# Patient Record
Sex: FEMALE | Race: OTHER | HISPANIC OR LATINO | URBAN - METROPOLITAN AREA
[De-identification: names, ages, dates, MRNs, and addresses within clinical notes are randomized per-mention and may not be internally consistent; named-entity substitution may affect disease eponyms.]

---

## 2018-07-31 ENCOUNTER — INPATIENT (INPATIENT)
Facility: HOSPITAL | Age: 83
LOS: 2 days | Discharge: HOME | DRG: 378 | End: 2018-08-03
Attending: INTERNAL MEDICINE | Admitting: INTERNAL MEDICINE
Payer: MEDICARE

## 2018-07-31 VITALS
DIASTOLIC BLOOD PRESSURE: 72 MMHG | RESPIRATION RATE: 18 BRPM | OXYGEN SATURATION: 99 % | HEART RATE: 96 BPM | SYSTOLIC BLOOD PRESSURE: 131 MMHG | WEIGHT: 100.09 LBS | TEMPERATURE: 98 F

## 2018-07-31 DIAGNOSIS — D50.0 IRON DEFICIENCY ANEMIA SECONDARY TO BLOOD LOSS (CHRONIC): ICD-10-CM

## 2018-07-31 DIAGNOSIS — F03.90 UNSPECIFIED DEMENTIA WITHOUT BEHAVIORAL DISTURBANCE: ICD-10-CM

## 2018-07-31 DIAGNOSIS — Z29.9 ENCOUNTER FOR PROPHYLACTIC MEASURES, UNSPECIFIED: ICD-10-CM

## 2018-07-31 DIAGNOSIS — K92.2 GASTROINTESTINAL HEMORRHAGE, UNSPECIFIED: ICD-10-CM

## 2018-07-31 DIAGNOSIS — R60.9 EDEMA, UNSPECIFIED: ICD-10-CM

## 2018-07-31 DIAGNOSIS — Z71.89 OTHER SPECIFIED COUNSELING: ICD-10-CM

## 2018-07-31 LAB
ALBUMIN SERPL ELPH-MCNC: 4.4 G/DL — SIGNIFICANT CHANGE UP (ref 3.3–5)
ALP SERPL-CCNC: 124 U/L — HIGH (ref 40–120)
ALT FLD-CCNC: 15 U/L — SIGNIFICANT CHANGE UP (ref 10–45)
ANION GAP SERPL CALC-SCNC: 14 MMOL/L — SIGNIFICANT CHANGE UP (ref 5–17)
APTT BLD: 23.3 SEC — LOW (ref 27.5–37.4)
AST SERPL-CCNC: 29 U/L — SIGNIFICANT CHANGE UP (ref 10–40)
BASOPHILS # BLD AUTO: 0.1 K/UL — SIGNIFICANT CHANGE UP (ref 0–0.2)
BASOPHILS NFR BLD AUTO: 1 % — SIGNIFICANT CHANGE UP (ref 0–2)
BILIRUB SERPL-MCNC: 0.4 MG/DL — SIGNIFICANT CHANGE UP (ref 0.2–1.2)
BLD GP AB SCN SERPL QL: NEGATIVE — SIGNIFICANT CHANGE UP
BUN SERPL-MCNC: 26 MG/DL — HIGH (ref 7–23)
CALCIUM SERPL-MCNC: 9.2 MG/DL — SIGNIFICANT CHANGE UP (ref 8.4–10.5)
CHLORIDE SERPL-SCNC: 102 MMOL/L — SIGNIFICANT CHANGE UP (ref 96–108)
CO2 SERPL-SCNC: 23 MMOL/L — SIGNIFICANT CHANGE UP (ref 22–31)
CREAT SERPL-MCNC: 0.93 MG/DL — SIGNIFICANT CHANGE UP (ref 0.5–1.3)
EOSINOPHIL # BLD AUTO: 0.2 K/UL — SIGNIFICANT CHANGE UP (ref 0–0.5)
EOSINOPHIL NFR BLD AUTO: 3.3 % — SIGNIFICANT CHANGE UP (ref 0–6)
GAS PNL BLDV: SIGNIFICANT CHANGE UP
GLUCOSE SERPL-MCNC: 104 MG/DL — HIGH (ref 70–99)
HCT VFR BLD CALC: 25.1 % — LOW (ref 34.5–45)
HCT VFR BLD CALC: 27.2 % — LOW (ref 34.5–45)
HGB BLD-MCNC: 7.3 G/DL — LOW (ref 11.5–15.5)
HGB BLD-MCNC: 7.9 G/DL — LOW (ref 11.5–15.5)
INR BLD: 1.13 RATIO — SIGNIFICANT CHANGE UP (ref 0.88–1.16)
LYMPHOCYTES # BLD AUTO: 1.2 K/UL — SIGNIFICANT CHANGE UP (ref 1–3.3)
LYMPHOCYTES # BLD AUTO: 18.2 % — SIGNIFICANT CHANGE UP (ref 13–44)
MCHC RBC-ENTMCNC: 18.1 PG — LOW (ref 27–34)
MCHC RBC-ENTMCNC: 18.1 PG — LOW (ref 27–34)
MCHC RBC-ENTMCNC: 29 GM/DL — LOW (ref 32–36)
MCHC RBC-ENTMCNC: 29.2 GM/DL — LOW (ref 32–36)
MCV RBC AUTO: 62.1 FL — LOW (ref 80–100)
MCV RBC AUTO: 62.4 FL — LOW (ref 80–100)
MONOCYTES # BLD AUTO: 0.7 K/UL — SIGNIFICANT CHANGE UP (ref 0–0.9)
MONOCYTES NFR BLD AUTO: 10.8 % — SIGNIFICANT CHANGE UP (ref 2–14)
NEUTROPHILS # BLD AUTO: 4.4 K/UL — SIGNIFICANT CHANGE UP (ref 1.8–7.4)
NEUTROPHILS NFR BLD AUTO: 66.7 % — SIGNIFICANT CHANGE UP (ref 43–77)
PLATELET # BLD AUTO: 235 K/UL — SIGNIFICANT CHANGE UP (ref 150–400)
PLATELET # BLD AUTO: 271 K/UL — SIGNIFICANT CHANGE UP (ref 150–400)
POTASSIUM SERPL-MCNC: 3.5 MMOL/L — SIGNIFICANT CHANGE UP (ref 3.5–5.3)
POTASSIUM SERPL-SCNC: 3.5 MMOL/L — SIGNIFICANT CHANGE UP (ref 3.5–5.3)
PROT SERPL-MCNC: 8.2 G/DL — SIGNIFICANT CHANGE UP (ref 6–8.3)
PROTHROM AB SERPL-ACNC: 12.2 SEC — SIGNIFICANT CHANGE UP (ref 9.8–12.7)
RBC # BLD: 4.04 M/UL — SIGNIFICANT CHANGE UP (ref 3.8–5.2)
RBC # BLD: 4.36 M/UL — SIGNIFICANT CHANGE UP (ref 3.8–5.2)
RBC # FLD: 18.4 % — HIGH (ref 10.3–14.5)
RBC # FLD: 18.5 % — HIGH (ref 10.3–14.5)
RH IG SCN BLD-IMP: POSITIVE — SIGNIFICANT CHANGE UP
RH IG SCN BLD-IMP: POSITIVE — SIGNIFICANT CHANGE UP
SODIUM SERPL-SCNC: 139 MMOL/L — SIGNIFICANT CHANGE UP (ref 135–145)
WBC # BLD: 6.5 K/UL — SIGNIFICANT CHANGE UP (ref 3.8–10.5)
WBC # BLD: 6.6 K/UL — SIGNIFICANT CHANGE UP (ref 3.8–10.5)
WBC # FLD AUTO: 6.5 K/UL — SIGNIFICANT CHANGE UP (ref 3.8–10.5)
WBC # FLD AUTO: 6.6 K/UL — SIGNIFICANT CHANGE UP (ref 3.8–10.5)

## 2018-07-31 PROCEDURE — 99497 ADVNCD CARE PLAN 30 MIN: CPT | Mod: 25

## 2018-07-31 PROCEDURE — 93010 ELECTROCARDIOGRAM REPORT: CPT

## 2018-07-31 PROCEDURE — 99223 1ST HOSP IP/OBS HIGH 75: CPT

## 2018-07-31 PROCEDURE — 71045 X-RAY EXAM CHEST 1 VIEW: CPT | Mod: 26

## 2018-07-31 PROCEDURE — 99285 EMERGENCY DEPT VISIT HI MDM: CPT | Mod: 25

## 2018-07-31 RX ORDER — ACETAMINOPHEN 500 MG
650 TABLET ORAL ONCE
Qty: 0 | Refills: 0 | Status: COMPLETED | OUTPATIENT
Start: 2018-07-31 | End: 2018-07-31

## 2018-07-31 RX ORDER — PANTOPRAZOLE SODIUM 20 MG/1
40 TABLET, DELAYED RELEASE ORAL
Qty: 0 | Refills: 0 | Status: DISCONTINUED | OUTPATIENT
Start: 2018-07-31 | End: 2018-08-03

## 2018-07-31 RX ORDER — FUROSEMIDE 40 MG
1 TABLET ORAL
Qty: 0 | Refills: 0 | COMMUNITY

## 2018-07-31 RX ORDER — QUETIAPINE FUMARATE 200 MG/1
12.5 TABLET, FILM COATED ORAL ONCE
Qty: 0 | Refills: 0 | Status: COMPLETED | OUTPATIENT
Start: 2018-07-31 | End: 2018-07-31

## 2018-07-31 RX ORDER — DONEPEZIL HYDROCHLORIDE 10 MG/1
1 TABLET, FILM COATED ORAL
Qty: 0 | Refills: 0 | COMMUNITY

## 2018-07-31 RX ORDER — DONEPEZIL HYDROCHLORIDE 10 MG/1
10 TABLET, FILM COATED ORAL AT BEDTIME
Qty: 0 | Refills: 0 | Status: DISCONTINUED | OUTPATIENT
Start: 2018-07-31 | End: 2018-08-03

## 2018-07-31 RX ORDER — QUETIAPINE FUMARATE 200 MG/1
1 TABLET, FILM COATED ORAL
Qty: 0 | Refills: 0 | COMMUNITY

## 2018-07-31 RX ORDER — QUETIAPINE FUMARATE 200 MG/1
25 TABLET, FILM COATED ORAL AT BEDTIME
Qty: 0 | Refills: 0 | Status: DISCONTINUED | OUTPATIENT
Start: 2018-07-31 | End: 2018-08-03

## 2018-07-31 RX ADMIN — QUETIAPINE FUMARATE 25 MILLIGRAM(S): 200 TABLET, FILM COATED ORAL at 21:40

## 2018-07-31 RX ADMIN — DONEPEZIL HYDROCHLORIDE 10 MILLIGRAM(S): 10 TABLET, FILM COATED ORAL at 21:40

## 2018-07-31 RX ADMIN — QUETIAPINE FUMARATE 12.5 MILLIGRAM(S): 200 TABLET, FILM COATED ORAL at 16:31

## 2018-07-31 NOTE — H&P ADULT - PROBLEM SELECTOR PLAN 2
Currently Hb stable 7.9, associated with generalized weakness. no episodes of melena and BRBPR    f/u CBC in am, monitor H/h, transfuse <8    monitor vitals   protonix  GI consult Currently Hb stable 7.9, associated with generalized weakness. no episodes of melena and BRBPR    f/u CBC in am, monitor H/h, transfuse <8    monitor vitals   protonix  GI consult for possible EGD / colonoscopy

## 2018-07-31 NOTE — ED PROVIDER NOTE - ATTENDING CONTRIBUTION TO CARE
Private Physician Fei  86y female pmh  Dementia, comes to ed referred from pmd office, Seen for first time yesterday with murmur labs today hgb 6/25. Remote hx of gi bleed few months ago. Discussed with Private Physician referred for admit /tranfsion. PE WDWN nad normocephalic atraumatic chest clear anterior & posterior abd soft +bs no mass guarding, Neruo no focal defecs. CV 2/6   Marco Becker MD, Facep

## 2018-07-31 NOTE — H&P ADULT - ASSESSMENT
87 y.o female with PMhx of Alzheimer's dementia was sent to the hospital by PCP due to significant drop in H/H 6.9. and weakness for the last few months. In the ED was noted to have + guaiac, Hb of 7.9, hemodynamic stable has not required any PRBC, is being admitted for symptomatic anemia 2/2 GI bleed.

## 2018-07-31 NOTE — CHART NOTE - NSCHARTNOTEFT_GEN_A_CORE
called by RN with H/H of 7.3/25.1. Pt with dementia, [place] speaking.   Pt admitted with anemia, + guaiac in ED.   Pt asymptomatic, no active bleeding  Vital Signs Last 24 Hrs  T(C): 36.9 (31 Jul 2018 20:43), Max: 36.9 (31 Jul 2018 20:43)  T(F): 98.5 (31 Jul 2018 20:43), Max: 98.5 (31 Jul 2018 20:43)  HR: 102 (31 Jul 2018 20:43) (80 - 102)  BP: 125/65 (31 Jul 2018 20:43) (125/65 - 145/72)  BP(mean): 91 (31 Jul 2018 16:01) (91 - 91)  RR: 18 (31 Jul 2018 20:43) (18 - 18)  SpO2: 98% (31 Jul 2018 20:43) (96% - 99%)    Unable to get son on phone for consent for blood transfusion, message left. D/W Dr. Tee. Will hold off on transfusion tonight as pt hemodynamically stable.. Repeat CBC in AM. F/U with primary team in AM.     Lianna Aldana NP  [id number]

## 2018-07-31 NOTE — H&P ADULT - PROBLEM SELECTOR PLAN 4
no pedal edema noted, per son Patient is asymptomatic   leg elevation   hold off on lasix 30 mg daily for now.

## 2018-07-31 NOTE — H&P ADULT - PROBLEM SELECTOR PLAN 5
Spoke face to face with son ( Alexander Rivas) at bedside, in the presence of the patient for 20 mins about advance care planning.   He is the HCP, will bring the form for record tomorrow  He would like the patient to be DNR/DNI, he will discuss with family and likely sign the MOLST form tomorrow.

## 2018-07-31 NOTE — H&P ADULT - PROBLEM SELECTOR PLAN 3
Currently Hb stable 7.9, associated with generalized weakness. no episodes of melena and BRBPR, + guaiac in ED    f/u CBC in am, monitor H/h, transfuse <8    monitor vitals   protonix  GI consult Currently Hb stable 7.9, associated with generalized weakness. no episodes of melena and BRBPR, + guaiac in ED    f/u CBC in am, monitor H/h, transfuse <8    monitor vitals   protonix  GI consult for possible EGD / colonoscopy

## 2018-07-31 NOTE — ED PROVIDER NOTE - OBJECTIVE STATEMENT
85yo female PMH dementia 85yo female PMH dementia, HTN, presenting from doctor's office after having routine lab work and found to have Hb of 6.8. Patient states that she has been feeling a little weak over the last few months. No chest pain or shortness of breath. Patient has h/o GI bleed a few months ago as per PMD. Patient denies black or bloody stools. No bleeding from anywhere else. No abdominal pain. No dizziness or lightheadedness.

## 2018-07-31 NOTE — H&P ADULT - HISTORY OF PRESENT ILLNESS
87 y.o female with PMhx of Alzheimer's dementia was sent to the hospital by PCP due to significant drop in H/H 6.8. History was taken from son Alexander Rivas at bedside due to pt's underling dementia. 87 y.o female with PMhx of Alzheimer's dementia was sent to the hospital by PCP due to significant drop in H/H 6.8. History was taken from son Alexander Rivas at bedside due to pt's underling dementia. Patient has been feeling weak for the last few months. She had an episode of bleeding ( unsure per rectum or vagina) 4 m.o ago when Per HHA she noticed BRB in the patient's underwear. Subsequently she was seen by her PCP this week who checked her H/H and noticed the significant drop. She denies any chest pain, SOB, abdominal pain, weight loss, n/v and bruising.     As per son she has never has a colonoscopy, EGD and Pap smear in the past. The patient lives with her daughter at home, has a HHA for 10 hr x 4 days. She is able to ambulate without any assistance, is mainly dependent on her ADLs and at times gets agitated in the evenings. She was recently started on furosemide 20 mg daily by her PCP due to b/l LE edema. 87 y.o female with PMhx of Alzheimer's dementia was sent to the hospital by PCP due to significant drop in H/H 6.8. History was taken from son Alexander Rivas at bedside due to pt's underling dementia. Patient has been feeling weak for the last few months. She had an episode of bleeding ( unsure per rectum or vagina) 4 m.o ago when Per HHA she noticed BRB in the patient's underwear. Subsequently she was seen by her PCP this week who checked her H/H and noticed the significant drop. She denies any chest pain, SOB, abdominal pain, weight loss, n/v, melena, BRBPR and bruising. In the ED pt had a + stool guaiac.    As per son she has never has a colonoscopy, EGD and Pap smear in the past. The patient lives with her daughter at home, has a HHA for 10 hr x 4 days. She is able to ambulate without any assistance, is mainly dependent on her ADLs and at times gets agitated in the evenings. She was recently started on furosemide 20 mg daily by her PCP due to b/l LE edema. 87 y.o female with PMhx of Alzheimer's dementia was sent to the hospital by PCP due to significant drop in H/H 6.8. History was taken from son Alexander Rivas at bedside due to pt's underling dementia. Patient has been feeling weak for the last few months. She had an episode of bleeding ( unsure per rectum or vagina) 4 m.o ago when Per HHA she noticed BRB in the patient's underwear. Subsequently she was seen by her PCP this week who checked her H/H and noticed the significant drop. She denies any chest pain, SOB, abdominal pain, weight loss, n/v, melena, BRBPR and bruising. In the ED pt had a + stool guaiac.    As per son she has never has a colonoscopy, EGD and Pap smear in the past. The patient lives with her daughter at home, has a HHA for 10 hr x 4 days. She is able to ambulate without any assistance, is mainly dependent on her ADLs and at times gets agitated in the evenings. She was recently started on furosemide 20 mg daily by her PCP due to b/l LE edema, pt a asymptomatic.

## 2018-07-31 NOTE — ED ADULT NURSE NOTE - NSIMPLEMENTINTERV_GEN_ALL_ED
Implemented All Fall with Harm Risk Interventions:  [place] to call system. Call bell, personal items and telephone within reach. Instruct patient to call for assistance. Room bathroom lighting operational. Non-slip footwear when patient is off stretcher. Physically safe environment: no spills, clutter or unnecessary equipment. Stretcher in lowest position, wheels locked, appropriate side rails in place. Provide visual cue, wrist band, yellow gown, etc. Monitor gait and stability. Monitor for mental status changes and reorient to person, place, and time. Review medications for side effects contributing to fall risk. Reinforce activity limits and safety measures with patient and family. Provide visual clues: red socks.

## 2018-07-31 NOTE — ED ADULT NURSE REASSESSMENT NOTE - NS ED NURSE REASSESS COMMENT FT1
Received report from covering nurse Humberto Bailey.  No acute respiratory distress noted, v/s obtained.

## 2018-07-31 NOTE — ED PROVIDER NOTE - MEDICAL DECISION MAKING DETAILS
87yo female with anemia and stool + blood, likely GI bleed, will check labs, admit to hospital. Laura Gusman DO

## 2018-07-31 NOTE — ED PROVIDER NOTE - PHYSICAL EXAMINATION
Gen: NAD  Head: NCAT  Lung: CTAB, no respiratory distress, no wheezing, rales, rhonchi  CV: normal s1/s2, rrr, no murmurs, Normal perfusion, pulses 2+ throughout  Abd: soft, NTND  MSK: No edema, no visible deformities, full range of motion in all 4 extremities  Neuro: No focal neurologic deficits  Skin: No rash   Psych: normal affect   Rectal: Guaiac +, brown stool

## 2018-07-31 NOTE — H&P ADULT - NSHPLABSRESULTS_GEN_ALL_CORE
Labs personally reviewed:                        7.9    6.6   )-----------( 271      ( 31 Jul 2018 13:58 )             27.2       07-31    139  |  102  |  26<H>  ----------------------------<  104<H>  3.5   |  23  |  0.93    Ca    9.2      31 Jul 2018 13:58    TPro  8.2  /  Alb  4.4  /  TBili  0.4  /  DBili  x   /  AST  29  /  ALT  15  /  AlkPhos  124<H>  07-31      PT/INR - ( 31 Jul 2018 13:58 )   PT: 12.2 sec;   INR: 1.13 ratio         PTT - ( 31 Jul 2018 13:58 )  PTT:23.3 sec      Imaging personally reviewed: CXR: no acute pulmonary process     EKG personally reviewed: Labs personally reviewed:                        7.9    6.6   )-----------( 271      ( 31 Jul 2018 13:58 )             27.2       07-31    139  |  102  |  26<H>  ----------------------------<  104<H>  3.5   |  23  |  0.93    Ca    9.2      31 Jul 2018 13:58    TPro  8.2  /  Alb  4.4  /  TBili  0.4  /  DBili  x   /  AST  29  /  ALT  15  /  AlkPhos  124<H>  07-31      PT/INR - ( 31 Jul 2018 13:58 )   PT: 12.2 sec;   INR: 1.13 ratio         PTT - ( 31 Jul 2018 13:58 )  PTT:23.3 sec      Imaging personally reviewed: CXR: no acute pulmonary process

## 2018-08-01 LAB
HCT VFR BLD CALC: 25.7 % — LOW (ref 34.5–45)
HCT VFR BLD CALC: 31.5 % — LOW (ref 34.5–45)
HGB BLD-MCNC: 7.3 G/DL — LOW (ref 11.5–15.5)
HGB BLD-MCNC: 9.6 G/DL — LOW (ref 11.5–15.5)
MCHC RBC-ENTMCNC: 18.2 PG — LOW (ref 27–34)
MCHC RBC-ENTMCNC: 20 PG — LOW (ref 27–34)
MCHC RBC-ENTMCNC: 28.4 GM/DL — LOW (ref 32–36)
MCHC RBC-ENTMCNC: 30.6 GM/DL — LOW (ref 32–36)
MCV RBC AUTO: 63.9 FL — LOW (ref 80–100)
MCV RBC AUTO: 65.5 FL — LOW (ref 80–100)
PLATELET # BLD AUTO: 244 K/UL — SIGNIFICANT CHANGE UP (ref 150–400)
PLATELET # BLD AUTO: 265 K/UL — SIGNIFICANT CHANGE UP (ref 150–400)
RBC # BLD: 4.02 M/UL — SIGNIFICANT CHANGE UP (ref 3.8–5.2)
RBC # BLD: 4.81 M/UL — SIGNIFICANT CHANGE UP (ref 3.8–5.2)
RBC # FLD: 19.4 % — HIGH (ref 10.3–14.5)
RBC # FLD: 20.9 % — HIGH (ref 10.3–14.5)
WBC # BLD: 7.45 K/UL — SIGNIFICANT CHANGE UP (ref 3.8–10.5)
WBC # BLD: 7.5 K/UL — SIGNIFICANT CHANGE UP (ref 3.8–10.5)
WBC # FLD AUTO: 7.45 K/UL — SIGNIFICANT CHANGE UP (ref 3.8–10.5)
WBC # FLD AUTO: 7.5 K/UL — SIGNIFICANT CHANGE UP (ref 3.8–10.5)

## 2018-08-01 PROCEDURE — 88312 SPECIAL STAINS GROUP 1: CPT | Mod: 26

## 2018-08-01 PROCEDURE — 88305 TISSUE EXAM BY PATHOLOGIST: CPT | Mod: 26

## 2018-08-01 RX ORDER — SODIUM CHLORIDE 9 MG/ML
1000 INJECTION INTRAMUSCULAR; INTRAVENOUS; SUBCUTANEOUS
Qty: 0 | Refills: 0 | Status: DISCONTINUED | OUTPATIENT
Start: 2018-08-01 | End: 2018-08-02

## 2018-08-01 RX ADMIN — QUETIAPINE FUMARATE 25 MILLIGRAM(S): 200 TABLET, FILM COATED ORAL at 21:07

## 2018-08-01 RX ADMIN — SODIUM CHLORIDE 75 MILLILITER(S): 9 INJECTION INTRAMUSCULAR; INTRAVENOUS; SUBCUTANEOUS at 14:54

## 2018-08-01 RX ADMIN — DONEPEZIL HYDROCHLORIDE 10 MILLIGRAM(S): 10 TABLET, FILM COATED ORAL at 21:07

## 2018-08-01 RX ADMIN — Medication 650 MILLIGRAM(S): at 00:03

## 2018-08-01 RX ADMIN — PANTOPRAZOLE SODIUM 40 MILLIGRAM(S): 20 TABLET, DELAYED RELEASE ORAL at 18:02

## 2018-08-01 RX ADMIN — Medication 650 MILLIGRAM(S): at 00:38

## 2018-08-01 NOTE — DISCHARGE NOTE ADULT - HOSPITAL COURSE
87 y.o female with PMhx of Alzheimer's dementia was sent to the hospital by PCP due to significant drop in H/H 6.8. History was taken from son Alexander Rivas at bedside due to pt's underling dementia. Patient has been feeling weak for the last few months. She had an episode of bleeding ( unsure per rectum or vagina) 4 m.o ago when Per HHA she noticed BRB in the patient's underwear. Subsequently she was seen by her PCP this week who checked her H/H and noticed the significant drop. She denies any chest pain, SOB, abdominal pain, weight loss, n/v, melena, BRBPR and bruising. In the ED pt had a + stool guaiac.    As per son she has never has a colonoscopy, EGD and Pap smear in the past. The patient lives with her daughter at home, has a HHA for 10 hr x 4 days. She is able to ambulate without any assistance, is mainly dependent on her ADLs and at times gets agitated in the evenings. She was recently started on furosemide 20 mg daily by her PCP due to b/l LE edema, pt a asymptomatic. 87 y.o female with PMhx of Alzheimer's dementia was sent to the hospital by PCP due to significant drop in H/H 6.8. History was taken from son Alexander Rivas at bedside due to pt's underling dementia. Patient has been feeling weak for the last few months. She had an episode of bleeding ( unsure per rectum or vagina) 4 m.o ago when Per HHA she noticed BRB in the patient's underwear. Subsequently she was seen by her PCP this week who checked her H/H and noticed the significant drop. She denies any chest pain, SOB, abdominal pain, weight loss, n/v, melena, BRBPR and bruising. In the ED pt had a + stool guaiac.    As per son she has never has a colonoscopy, EGD and Pap smear in the past. The patient lives with her daughter at home, has a HHA for 10 hr x 4 days. She is able to ambulate without any assistance, is mainly dependent on her ADLs and at times gets agitated in the evenings. She was recently started on furosemide 20 mg daily by her PCP due to b/l LE edema, pt a asymptomatic.    8/1/18 - Endoscopy -  - Medium-sized hiatus hernia.                       - A single submucosal papule (nodule) found in the stomach. Biopsied.                       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 Treated with argon plasma                        coagulation (APC). Small intestinal angioectasias are likely the etiology of                        the patient's chronic microcytic anemia. Would defer further evaluation.                        There are likely more angioectasias in the mid and distal small intestine                        which can continue to be source of chronic blood loss.     Stable for discharge with iron supplementation and follow up with PMD and GI 87 y.o female with PMhx of Alzheimer's dementia was sent to the hospital by PCP due to significant drop in H/H 6.8. History was taken from son Alexander Rivas at bedside due to pt's underling dementia. Patient has been feeling weak for the last few months. She had an episode of bleeding ( unsure per rectum or vagina) 4 m.o ago when Per HHA she noticed BRB in the patient's underwear. Subsequently she was seen by her PCP this week who checked her H/H and noticed the significant drop. She denies any chest pain, SOB, abdominal pain, weight loss, n/v, melena, BRBPR and bruising. In the ED pt had a + stool guaiac.    As per son she has never has a colonoscopy, EGD and Pap smear in the past. The patient lives with her daughter at home, has a HHA for 10 hr x 4 days. She is able to ambulate without any assistance, is mainly dependent on her ADLs and at times gets agitated in the evenings. She was recently started on furosemide 20 mg daily by her PCP due to b/l LE edema, pt a asymptomatic.    8/1/18 - Endoscopy -  - Medium-sized hiatus hernia.                       - A single submucosal papule (nodule) found in the stomach. Biopsied.                       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 Treated with argon plasma                        coagulation (APC). Small intestinal angioectasias are likely the etiology of                        the patient's chronic microcytic anemia. Would defer further evaluation.                        There are likely more angioectasias in the mid and distal small intestine                        which can continue to be source of chronic blood loss.   8/2/18 - found to have UTI - started on IV abx.    Stable for discharge with iron supplementation, protonix, Po abx to treat UTI, and follow up with PMD and GI

## 2018-08-01 NOTE — DISCHARGE NOTE ADULT - CARE PROVIDER_API CALL
Christi Schmidt), Internal Medicine  [address] [place] [address]  Phone: [phone]  Fax: [phone]

## 2018-08-01 NOTE — DISCHARGE NOTE ADULT - PLAN OF CARE
Improved There are 2 common types of GI Bleed, Upper GI Bleed and Lower GI Bleed.  Upper GI Bleed affects the esophagus, stomach, and first part of the small intestine. Lower GI Bleed affects the colon and rectum.  Upper GI Bleed signs and symptoms to notify your Health Care Provider are vomiting blood, or coffee ground vomitus, and bowel movements that look like black tar.  Lower GI Bleed signs and symptoms to notify your health care provider are bright red bloody bowel movements.   Take your medications as prescribed by your Gastroenterologist.  If you have had an Endoscopy or Colonoscopy, follow up with your Gastroenterologist for Pathology results.  Avoid NSAIDs unless your Health Care Provider tells you that it is ok (Aspirin, Ibuprofen, Advil, Motrin, Aleve).  Follow up with your Gastroenterologist within 1-2 weeks of discharge. Continue with current regimen. HOME CARE INSTRUCTIONS  If you were prescribed antibiotics, take them exactly as your caregiver instructs you. Finish the medication even if you feel better after you have only taken some of the medication.  Drink enough water and fluids to keep your urine clear or pale yellow.  Avoid caffeine, tea, and carbonated beverages. They tend to irritate your bladder.  Empty your bladder often. Avoid holding urine for long periods of time.  Empty your bladder before and after sexual intercourse.  After a bowel movement, women should cleanse from front to back. Use each tissue only once.  SEEK MEDICAL CARE IF:  You have back pain.  You develop a fever.  Your symptoms do not begin to resolve within 3 days.  SEEK IMMEDIATE MEDICAL CARE IF:  You have severe back pain or lower abdominal pain.  You develop chills.  You have nausea or vomiting.  You have continued burning or discomfort with urination. You will need to follow up with your gastroenterologist, Dr. Schmidt, [phone], within one week of discharge - please call to make an appointment.  There are 2 common types of GI Bleed, Upper GI Bleed and Lower GI Bleed.  Upper GI Bleed affects the esophagus, stomach, and first part of the small intestine. Lower GI Bleed affects the colon and rectum.  Upper GI Bleed signs and symptoms to notify your Health Care Provider are vomiting blood, or coffee ground vomitus, and bowel movements that look like black tar.  Lower GI Bleed signs and symptoms to notify your health care provider are bright red bloody bowel movements.   Take your medications as prescribed by your Gastroenterologist.  If you have had an Endoscopy or Colonoscopy, follow up with your Gastroenterologist for Pathology results.  Avoid NSAIDs unless your Health Care Provider tells you that it is ok (Aspirin, Ibuprofen, Advil, Motrin, Aleve).  Follow up with your Gastroenterologist within 1-2 weeks of discharge. You will need to follow up with your primary medical doctor within one week of discharge - please call to make an appointment.  Continue with current regimen. You will take your antibiotic, Ceftin, through the am dose on 8/5/18 and then discontinue.    HOME CARE INSTRUCTIONS  If you were prescribed antibiotics, take them exactly as your caregiver instructs you. Finish the medication even if you feel better after you have only taken some of the medication.  Drink enough water and fluids to keep your urine clear or pale yellow.  Avoid caffeine, tea, and carbonated beverages. They tend to irritate your bladder.  Empty your bladder often. Avoid holding urine for long periods of time.  Empty your bladder before and after sexual intercourse.  After a bowel movement, women should cleanse from front to back. Use each tissue only once.  SEEK MEDICAL CARE IF:  You have back pain.  You develop a fever.  Your symptoms do not begin to resolve within 3 days.  SEEK IMMEDIATE MEDICAL CARE IF:  You have severe back pain or lower abdominal pain.  You develop chills.  You have nausea or vomiting.  You have continued burning or discomfort with urination.

## 2018-08-01 NOTE — DISCHARGE NOTE ADULT - CONDITIONS AT DISCHARGE
axox2, confused, admitted for GI bleed resolved, being treated for UTI sent home on ABT, skin intact, ambulatory

## 2018-08-01 NOTE — DISCHARGE NOTE ADULT - ADDITIONAL INSTRUCTIONS
You will need to follow up with your gastroenterologist, Dr. Schmidt, [phone], within one week of discharge - please call to make an appointment.    You will need to follow up with your primary medical doctor within one week of discharge - please call to make an appointment.

## 2018-08-01 NOTE — DISCHARGE NOTE ADULT - PATIENT PORTAL LINK FT
You can access the [facility], offered by [facility], by registering with the following website: [email or web address]

## 2018-08-01 NOTE — DISCHARGE NOTE ADULT - CARE PLAN
Principal Discharge DX:	GI bleed  Goal:	Improved  Assessment and plan of treatment:	There are 2 common types of GI Bleed, Upper GI Bleed and Lower GI Bleed.  Upper GI Bleed affects the esophagus, stomach, and first part of the small intestine. Lower GI Bleed affects the colon and rectum.  Upper GI Bleed signs and symptoms to notify your Health Care Provider are vomiting blood, or coffee ground vomitus, and bowel movements that look like black tar.  Lower GI Bleed signs and symptoms to notify your health care provider are bright red bloody bowel movements.   Take your medications as prescribed by your Gastroenterologist.  If you have had an Endoscopy or Colonoscopy, follow up with your Gastroenterologist for Pathology results.  Avoid NSAIDs unless your Health Care Provider tells you that it is ok (Aspirin, Ibuprofen, Advil, Motrin, Aleve).  Follow up with your Gastroenterologist within 1-2 weeks of discharge.  Secondary Diagnosis:	Dementia  Assessment and plan of treatment:	Continue with current regimen. Principal Discharge DX:	GI bleed  Goal:	Improved  Assessment and plan of treatment:	There are 2 common types of GI Bleed, Upper GI Bleed and Lower GI Bleed.  Upper GI Bleed affects the esophagus, stomach, and first part of the small intestine. Lower GI Bleed affects the colon and rectum.  Upper GI Bleed signs and symptoms to notify your Health Care Provider are vomiting blood, or coffee ground vomitus, and bowel movements that look like black tar.  Lower GI Bleed signs and symptoms to notify your health care provider are bright red bloody bowel movements.   Take your medications as prescribed by your Gastroenterologist.  If you have had an Endoscopy or Colonoscopy, follow up with your Gastroenterologist for Pathology results.  Avoid NSAIDs unless your Health Care Provider tells you that it is ok (Aspirin, Ibuprofen, Advil, Motrin, Aleve).  Follow up with your Gastroenterologist within 1-2 weeks of discharge.  Secondary Diagnosis:	Dementia  Assessment and plan of treatment:	Continue with current regimen.  Secondary Diagnosis:	UTI (urinary tract infection)  Assessment and plan of treatment:	HOME CARE INSTRUCTIONS  If you were prescribed antibiotics, take them exactly as your caregiver instructs you. Finish the medication even if you feel better after you have only taken some of the medication.  Drink enough water and fluids to keep your urine clear or pale yellow.  Avoid caffeine, tea, and carbonated beverages. They tend to irritate your bladder.  Empty your bladder often. Avoid holding urine for long periods of time.  Empty your bladder before and after sexual intercourse.  After a bowel movement, women should cleanse from front to back. Use each tissue only once.  SEEK MEDICAL CARE IF:  You have back pain.  You develop a fever.  Your symptoms do not begin to resolve within 3 days.  SEEK IMMEDIATE MEDICAL CARE IF:  You have severe back pain or lower abdominal pain.  You develop chills.  You have nausea or vomiting.  You have continued burning or discomfort with urination. Principal Discharge DX:	GI bleed  Goal:	Improved  Assessment and plan of treatment:	You will need to follow up with your gastroenterologist, Dr. Schmidt, [phone], within one week of discharge - please call to make an appointment.  There are 2 common types of GI Bleed, Upper GI Bleed and Lower GI Bleed.  Upper GI Bleed affects the esophagus, stomach, and first part of the small intestine. Lower GI Bleed affects the colon and rectum.  Upper GI Bleed signs and symptoms to notify your Health Care Provider are vomiting blood, or coffee ground vomitus, and bowel movements that look like black tar.  Lower GI Bleed signs and symptoms to notify your health care provider are bright red bloody bowel movements.   Take your medications as prescribed by your Gastroenterologist.  If you have had an Endoscopy or Colonoscopy, follow up with your Gastroenterologist for Pathology results.  Avoid NSAIDs unless your Health Care Provider tells you that it is ok (Aspirin, Ibuprofen, Advil, Motrin, Aleve).  Follow up with your Gastroenterologist within 1-2 weeks of discharge.  Secondary Diagnosis:	Dementia  Assessment and plan of treatment:	You will need to follow up with your primary medical doctor within one week of discharge - please call to make an appointment.  Continue with current regimen.  Secondary Diagnosis:	UTI (urinary tract infection)  Assessment and plan of treatment:	You will take your antibiotic, Ceftin, through the am dose on 8/5/18 and then discontinue.    HOME CARE INSTRUCTIONS  If you were prescribed antibiotics, take them exactly as your caregiver instructs you. Finish the medication even if you feel better after you have only taken some of the medication.  Drink enough water and fluids to keep your urine clear or pale yellow.  Avoid caffeine, tea, and carbonated beverages. They tend to irritate your bladder.  Empty your bladder often. Avoid holding urine for long periods of time.  Empty your bladder before and after sexual intercourse.  After a bowel movement, women should cleanse from front to back. Use each tissue only once.  SEEK MEDICAL CARE IF:  You have back pain.  You develop a fever.  Your symptoms do not begin to resolve within 3 days.  SEEK IMMEDIATE MEDICAL CARE IF:  You have severe back pain or lower abdominal pain.  You develop chills.  You have nausea or vomiting.  You have continued burning or discomfort with urination.

## 2018-08-01 NOTE — CONSULT NOTE ADULT - SUBJECTIVE AND OBJECTIVE BOX
87 y.o female with PMhx of Alzheimer's dementia admitted w/ symptomatic anemia.  Patient has dementia and is unable to provide any information.   HHA at bedside says she saw BRB in patient's underwear once months ago but otherwise reports brown stool  Reportedly guaiac + in ED.  As per son she has never has a colonoscopy, EGD and Pap smear in the past.   Patient denies any n/v/abd pain. HHA reports good appetite, stable weight and no change in BMs    PAST MEDICAL & SURGICAL HISTORY:  Dementia  No significant past surgical history      MEDICATIONS  (STANDING):  donepezil 10 milliGRAM(s) Oral at bedtime  pantoprazole   Suspension 40 milliGRAM(s) Oral before dinner  QUEtiapine 25 milliGRAM(s) Oral at bedtime  sodium chloride 0.9%. 1000 milliLiter(s) (75 mL/Hr) IV Continuous <Continuous>    MEDICATIONS  (PRN):      Allergies    No Known Allergies    Intolerances        Review of Systems:    UNABLE TO OBTAIN AS PATIENT HAS DEMENTIA      Vital Signs Last 24 Hrs  T(C): 36.8 (01 Aug 2018 11:29), Max: 37 (01 Aug 2018 10:39)  T(F): 98.2 (01 Aug 2018 11:29), Max: 98.6 (01 Aug 2018 10:39)  HR: 92 (01 Aug 2018 11:29) (80 - 102)  BP: 156/72 (01 Aug 2018 11:29) (121/69 - 156/72)  BP(mean): 91 (31 Jul 2018 16:01) (91 - 91)  RR: 18 (01 Aug 2018 11:29) (18 - 18)  SpO2: 97% (01 Aug 2018 11:29) (96% - 99%)    PHYSICAL EXAM:    Constitutional: NAD, well-developed  Neck: No LAD, supple  Respiratory: CTA and P  Cardiovascular: S1 and S2, RRR, no M  Gastrointestinal: BS+, soft, NT/ND, neg HSM,  Extremities: No peripheral edema, neg clubing, cyanosis  Vascular: 2+ peripheral pulses  Neurological: A/O x 3, no focal deficits  Psychiatric: Normal mood, normal affect  Skin: No rashes      LABS:                        7.3    7.45  )-----------( 265      ( 01 Aug 2018 08:35 )             25.7                         7.3    6.5   )-----------( 235      ( 31 Jul 2018 21:21 )             25.1                         7.9    6.6   )-----------( 271      ( 31 Jul 2018 13:58 )             27.2     07-31    139  |  102  |  26<H>  ----------------------------<  104<H>  3.5   |  23  |  0.93    Ca    9.2      31 Jul 2018 13:58    TPro  8.2  /  Alb  4.4  /  TBili  0.4  /  DBili  x   /  AST  29  /  ALT  15  /  AlkPhos  124<H>  07-31    PT/INR - ( 31 Jul 2018 13:58 )   PT: 12.2 sec;   INR: 1.13 ratio         PTT - ( 31 Jul 2018 13:58 )  PTT:23.3 sec    LIVER FUNCTIONS - ( 31 Jul 2018 13:58 )  Alb: 4.4 g/dL / Pro: 8.2 g/dL / ALK PHOS: 124 U/L / ALT: 15 U/L / AST: 29 U/L / GGT: x                 RADIOLOGY & ADDITIONAL TESTS:

## 2018-08-01 NOTE — DISCHARGE NOTE ADULT - MEDICATION SUMMARY - MEDICATIONS TO TAKE
I will START or STAY ON the medications listed below when I get home from the hospital:    Rolling Walker  -- Length of need - 99    R60.9  -- Indication: For Weakness    QUEtiapine 25 mg oral tablet  -- 1 tab(s) by mouth once a day (at bedtime)  -- Indication: For Dementia    cefuroxime 500 mg oral tablet  -- 1 tab(s) by mouth every 12 hours  Start tomorrow am and discontinue after am dose 8/5/18  -- Indication: For UTI (urinary tract infection)    donepezil 10 mg oral tablet  -- 1 tab(s) by mouth once a day  -- Indication: For Dementia    furosemide 20 mg oral tablet  -- 1 tab(s) by mouth once a day  -- Indication: For Dependent edema    ferrous sulfate 325 mg (65 mg elemental iron) oral tablet  -- 1 tab(s) by mouth once a day  -- Indication: For Anemia    pantoprazole 40 mg oral granule, delayed release  -- 40 milligram(s) by mouth once a day   -- Indication: For GI bleed I will START or STAY ON the medications listed below when I get home from the hospital:    Rolling Walker  -- Length of need - 99    R60.9  -- Indication: For Weakness    QUEtiapine 25 mg oral tablet  -- 1 tab(s) by mouth once a day (at bedtime)  -- Indication: For Dementia    cefuroxime 250 mg oral tablet  -- 1 tab(s) by mouth every 12 hours  Start tomorrow am and discontinue after am dose 8/5/18  -- Indication: For UTI (urinary tract infection)    donepezil 10 mg oral tablet  -- 1 tab(s) by mouth once a day  -- Indication: For Dementia    furosemide 20 mg oral tablet  -- 1 tab(s) by mouth once a day  -- Indication: For Dependent edema    ferrous sulfate 325 mg (65 mg elemental iron) oral tablet  -- 1 tab(s) by mouth once a day  -- Indication: For Anemia    pantoprazole 40 mg oral granule, delayed release  -- 40 milligram(s) by mouth once a day   -- Indication: For GI bleed

## 2018-08-01 NOTE — CONSULT NOTE ADULT - ASSESSMENT
87 yo F w/ symptomatic anemia - microcytic  No overt GI bleed but reportedly FOBT+  No GI symptoms  No prior EGD/Colonoscopy  Given patient's age and dementia, favor EGD today to r/o UGI source of anemia.   1 U PRBC ongoing  If no etiology of anemia found on EGD today, would consider CT Scan abd/ p vs  colonoscopy in a patient w/ advanced dementia.  This was discussed in detail with patient's son, daughter and HHA. Son Alexander is in agreement with this plan and gives consent on behalf of his mother.    [phone]

## 2018-08-02 DIAGNOSIS — D72.829 ELEVATED WHITE BLOOD CELL COUNT, UNSPECIFIED: ICD-10-CM

## 2018-08-02 LAB
APPEARANCE UR: CLEAR — SIGNIFICANT CHANGE UP
BILIRUB UR-MCNC: NEGATIVE — SIGNIFICANT CHANGE UP
COLOR SPEC: YELLOW — SIGNIFICANT CHANGE UP
DIFF PNL FLD: NEGATIVE — SIGNIFICANT CHANGE UP
GLUCOSE UR QL: NEGATIVE — SIGNIFICANT CHANGE UP
HCT VFR BLD CALC: 27.7 % — LOW (ref 34.5–45)
HCT VFR BLD CALC: 30.6 % — LOW (ref 34.5–45)
HCT VFR BLD CALC: 31.2 % — LOW (ref 34.5–45)
HGB BLD-MCNC: 8.6 G/DL — LOW (ref 11.5–15.5)
HGB BLD-MCNC: 9.4 G/DL — LOW (ref 11.5–15.5)
HGB BLD-MCNC: 9.6 G/DL — LOW (ref 11.5–15.5)
KETONES UR-MCNC: NEGATIVE — SIGNIFICANT CHANGE UP
LEUKOCYTE ESTERASE UR-ACNC: ABNORMAL
MCHC RBC-ENTMCNC: 20 PG — LOW (ref 27–34)
MCHC RBC-ENTMCNC: 20.4 PG — LOW (ref 27–34)
MCHC RBC-ENTMCNC: 20.4 PG — LOW (ref 27–34)
MCHC RBC-ENTMCNC: 30.1 GM/DL — LOW (ref 32–36)
MCHC RBC-ENTMCNC: 30.9 GM/DL — LOW (ref 32–36)
MCHC RBC-ENTMCNC: 31.3 GM/DL — LOW (ref 32–36)
MCV RBC AUTO: 65.2 FL — LOW (ref 80–100)
MCV RBC AUTO: 66 FL — LOW (ref 80–100)
MCV RBC AUTO: 66.4 FL — LOW (ref 80–100)
NITRITE UR-MCNC: POSITIVE
PH UR: 5.5 — SIGNIFICANT CHANGE UP (ref 5–8)
PLATELET # BLD AUTO: 222 K/UL — SIGNIFICANT CHANGE UP (ref 150–400)
PLATELET # BLD AUTO: 243 K/UL — SIGNIFICANT CHANGE UP (ref 150–400)
PLATELET # BLD AUTO: 260 K/UL — SIGNIFICANT CHANGE UP (ref 150–400)
PROCALCITONIN SERPL-MCNC: 2.75 NG/ML — HIGH (ref 0.02–0.1)
PROT UR-MCNC: 30 MG/DL
RAPID RVP RESULT: SIGNIFICANT CHANGE UP
RBC # BLD: 4.19 M/UL — SIGNIFICANT CHANGE UP (ref 3.8–5.2)
RBC # BLD: 4.69 M/UL — SIGNIFICANT CHANGE UP (ref 3.8–5.2)
RBC # BLD: 4.7 M/UL — SIGNIFICANT CHANGE UP (ref 3.8–5.2)
RBC # FLD: 21.3 % — HIGH (ref 10.3–14.5)
RBC # FLD: 21.5 % — HIGH (ref 10.3–14.5)
RBC # FLD: 21.7 % — HIGH (ref 10.3–14.5)
SP GR SPEC: 1.03 — HIGH (ref 1.01–1.02)
UROBILINOGEN FLD QL: NEGATIVE — SIGNIFICANT CHANGE UP
WBC # BLD: 11.1 K/UL — HIGH (ref 3.8–10.5)
WBC # BLD: 18.9 K/UL — HIGH (ref 3.8–10.5)
WBC # BLD: 19.06 K/UL — HIGH (ref 3.8–10.5)
WBC # FLD AUTO: 11.1 K/UL — HIGH (ref 3.8–10.5)
WBC # FLD AUTO: 18.9 K/UL — HIGH (ref 3.8–10.5)
WBC # FLD AUTO: 19.06 K/UL — HIGH (ref 3.8–10.5)

## 2018-08-02 PROCEDURE — 71045 X-RAY EXAM CHEST 1 VIEW: CPT | Mod: 26

## 2018-08-02 RX ORDER — SODIUM CHLORIDE 9 MG/ML
1000 INJECTION INTRAMUSCULAR; INTRAVENOUS; SUBCUTANEOUS
Qty: 0 | Refills: 0 | Status: DISCONTINUED | OUTPATIENT
Start: 2018-08-02 | End: 2018-08-03

## 2018-08-02 RX ORDER — CEFTRIAXONE 500 MG/1
1 INJECTION, POWDER, FOR SOLUTION INTRAMUSCULAR; INTRAVENOUS ONCE
Qty: 0 | Refills: 0 | Status: COMPLETED | OUTPATIENT
Start: 2018-08-02 | End: 2018-08-02

## 2018-08-02 RX ORDER — FERROUS SULFATE 325(65) MG
325 TABLET ORAL DAILY
Qty: 0 | Refills: 0 | Status: DISCONTINUED | OUTPATIENT
Start: 2018-08-02 | End: 2018-08-03

## 2018-08-02 RX ORDER — CEFTRIAXONE 500 MG/1
INJECTION, POWDER, FOR SOLUTION INTRAMUSCULAR; INTRAVENOUS
Qty: 0 | Refills: 0 | Status: DISCONTINUED | OUTPATIENT
Start: 2018-08-02 | End: 2018-08-03

## 2018-08-02 RX ORDER — CEFTRIAXONE 500 MG/1
1 INJECTION, POWDER, FOR SOLUTION INTRAMUSCULAR; INTRAVENOUS EVERY 24 HOURS
Qty: 0 | Refills: 0 | Status: DISCONTINUED | OUTPATIENT
Start: 2018-08-03 | End: 2018-08-03

## 2018-08-02 RX ADMIN — Medication 325 MILLIGRAM(S): at 11:07

## 2018-08-02 RX ADMIN — SODIUM CHLORIDE 75 MILLILITER(S): 9 INJECTION INTRAMUSCULAR; INTRAVENOUS; SUBCUTANEOUS at 16:57

## 2018-08-02 RX ADMIN — QUETIAPINE FUMARATE 25 MILLIGRAM(S): 200 TABLET, FILM COATED ORAL at 20:23

## 2018-08-02 RX ADMIN — PANTOPRAZOLE SODIUM 40 MILLIGRAM(S): 20 TABLET, DELAYED RELEASE ORAL at 16:49

## 2018-08-02 RX ADMIN — CEFTRIAXONE 100 GRAM(S): 500 INJECTION, POWDER, FOR SOLUTION INTRAMUSCULAR; INTRAVENOUS at 22:53

## 2018-08-02 RX ADMIN — DONEPEZIL HYDROCHLORIDE 10 MILLIGRAM(S): 10 TABLET, FILM COATED ORAL at 20:23

## 2018-08-02 NOTE — PHYSICAL THERAPY INITIAL EVALUATION ADULT - PRECAUTIONS/LIMITATIONS, REHAB EVAL
As per son she has never has a colonoscopy, EGD and Pap smear in the past. The patient lives with her daughter at home, has a HHA for 10 hr x 4 days. She is able to ambulate without any assistance, is mainly dependent on her ADLs and at times gets agitated in the evenings. She was recently started on furosemide 20 mg daily by her PCP due to b/l LE edema, pt a asymptomatic. Hospital course: H&H 9.4/31.2.Upper endoscopy 8/1: Medium-sized hiatus hernia. A single submucosal papule (nodule) found in the stomach. Biopsied. Two non-bleeding angioectasias in the duodenum. Treated with argon plasma coagulation (APC). Small intestinal angioectasias are likely the etiology of the patient's chronic microcytic anemia. Would defer further evaluation. fall precautions/As per son she has never has a colonoscopy, EGD and Pap smear in the past. The patient lives with her daughter at home, has a HHA for 10 hr x 4 days. She is able to ambulate without any assistance, is mainly dependent on her ADLs and at times gets agitated in the evenings. She was recently started on furosemide 20 mg daily by her PCP due to b/l LE edema, pt a asymptomatic. Hospital course: H&H 9.4/31.2.Upper endoscopy 8/1: Medium-sized hiatus hernia. A single submucosal papule (nodule) found in the stomach. Biopsied. Two non-bleeding angioectasias in the duodenum. Treated with argon plasma coagulation (APC). Small intestinal angioectasias are likely the etiology of the patient's chronic microcytic anemia. Would defer further evaluation.

## 2018-08-02 NOTE — PHYSICAL THERAPY INITIAL EVALUATION ADULT - DISCHARGE DISPOSITION, PT EVAL
DC home with home PT services for general strengthening, to increase endurance, address fall prevention, perform balance training, safety assessment of home environment, and to restore pt's prior level of function, recommend rolling walker, note left for NELSY Edwards, JAMISON Velasquez aware. PT spoke with pam Munoz on phone regarding Dc planning and pts fxl status, agrees with plan, requests call if pt to be DCd today, LANNY Wick aware.

## 2018-08-02 NOTE — PHYSICAL THERAPY INITIAL EVALUATION ADULT - GENERAL OBSERVATIONS, REHAB EVAL
Pt received supine, +IVL, HHA at bedside, +[place] speaking, aide understanding english, pt with hx dementia, A&Ox1.

## 2018-08-02 NOTE — PHYSICAL THERAPY INITIAL EVALUATION ADULT - ADDITIONAL COMMENTS
Per SW, Patients daughter reported the patient resides in a private home with herself and the patients son-in-law in [place], [place].  PTA patient was dependent in ADLs and assist with ambulation. Patient's daughter reported they have a private hire HHA for 40 hrs a week (Tuesday-Friday for 10hours a day) and then on the weekend the patient's daughter will take care of the patient and the patient's son take's care of the patient on Monday's.

## 2018-08-03 VITALS
DIASTOLIC BLOOD PRESSURE: 64 MMHG | OXYGEN SATURATION: 95 % | SYSTOLIC BLOOD PRESSURE: 112 MMHG | HEART RATE: 89 BPM | TEMPERATURE: 99 F | RESPIRATION RATE: 18 BRPM

## 2018-08-03 LAB
ANION GAP SERPL CALC-SCNC: 10 MMOL/L — SIGNIFICANT CHANGE UP (ref 5–17)
BLD GP AB SCN SERPL QL: NEGATIVE — SIGNIFICANT CHANGE UP
BUN SERPL-MCNC: 28 MG/DL — HIGH (ref 7–23)
CALCIUM SERPL-MCNC: 8.3 MG/DL — LOW (ref 8.4–10.5)
CHLORIDE SERPL-SCNC: 109 MMOL/L — HIGH (ref 96–108)
CO2 SERPL-SCNC: 22 MMOL/L — SIGNIFICANT CHANGE UP (ref 22–31)
CREAT SERPL-MCNC: 0.91 MG/DL — SIGNIFICANT CHANGE UP (ref 0.5–1.3)
GLUCOSE SERPL-MCNC: 102 MG/DL — HIGH (ref 70–99)
HCT VFR BLD CALC: 27.1 % — LOW (ref 34.5–45)
HCT VFR BLD CALC: 28.2 % — LOW (ref 34.5–45)
HGB BLD-MCNC: 8 G/DL — LOW (ref 11.5–15.5)
HGB BLD-MCNC: 8.8 G/DL — LOW (ref 11.5–15.5)
MCHC RBC-ENTMCNC: 20.1 PG — LOW (ref 27–34)
MCHC RBC-ENTMCNC: 20.8 PG — LOW (ref 27–34)
MCHC RBC-ENTMCNC: 29.5 GM/DL — LOW (ref 32–36)
MCHC RBC-ENTMCNC: 31 GM/DL — LOW (ref 32–36)
MCV RBC AUTO: 67.1 FL — LOW (ref 80–100)
MCV RBC AUTO: 67.9 FL — LOW (ref 80–100)
PLATELET # BLD AUTO: 187 K/UL — SIGNIFICANT CHANGE UP (ref 150–400)
PLATELET # BLD AUTO: 205 K/UL — SIGNIFICANT CHANGE UP (ref 150–400)
POTASSIUM SERPL-MCNC: 3.9 MMOL/L — SIGNIFICANT CHANGE UP (ref 3.5–5.3)
POTASSIUM SERPL-SCNC: 3.9 MMOL/L — SIGNIFICANT CHANGE UP (ref 3.5–5.3)
RBC # BLD: 3.99 M/UL — SIGNIFICANT CHANGE UP (ref 3.8–5.2)
RBC # BLD: 4.21 M/UL — SIGNIFICANT CHANGE UP (ref 3.8–5.2)
RBC # FLD: 21.6 % — HIGH (ref 10.3–14.5)
RBC # FLD: 22 % — HIGH (ref 10.3–14.5)
RH IG SCN BLD-IMP: POSITIVE — SIGNIFICANT CHANGE UP
SODIUM SERPL-SCNC: 141 MMOL/L — SIGNIFICANT CHANGE UP (ref 135–145)
SURGICAL PATHOLOGY STUDY: SIGNIFICANT CHANGE UP
WBC # BLD: 7.5 K/UL — SIGNIFICANT CHANGE UP (ref 3.8–10.5)
WBC # BLD: 8.04 K/UL — SIGNIFICANT CHANGE UP (ref 3.8–10.5)
WBC # FLD AUTO: 7.5 K/UL — SIGNIFICANT CHANGE UP (ref 3.8–10.5)
WBC # FLD AUTO: 8.04 K/UL — SIGNIFICANT CHANGE UP (ref 3.8–10.5)

## 2018-08-03 PROCEDURE — 87186 SC STD MICRODIL/AGAR DIL: CPT

## 2018-08-03 PROCEDURE — 36430 TRANSFUSION BLD/BLD COMPNT: CPT

## 2018-08-03 PROCEDURE — 87581 M.PNEUMON DNA AMP PROBE: CPT

## 2018-08-03 PROCEDURE — 84145 PROCALCITONIN (PCT): CPT

## 2018-08-03 PROCEDURE — 85730 THROMBOPLASTIN TIME PARTIAL: CPT

## 2018-08-03 PROCEDURE — 84132 ASSAY OF SERUM POTASSIUM: CPT

## 2018-08-03 PROCEDURE — 99285 EMERGENCY DEPT VISIT HI MDM: CPT

## 2018-08-03 PROCEDURE — 71045 X-RAY EXAM CHEST 1 VIEW: CPT

## 2018-08-03 PROCEDURE — 87086 URINE CULTURE/COLONY COUNT: CPT

## 2018-08-03 PROCEDURE — 82435 ASSAY OF BLOOD CHLORIDE: CPT

## 2018-08-03 PROCEDURE — 85610 PROTHROMBIN TIME: CPT

## 2018-08-03 PROCEDURE — 80053 COMPREHEN METABOLIC PANEL: CPT

## 2018-08-03 PROCEDURE — 97161 PT EVAL LOW COMPLEX 20 MIN: CPT

## 2018-08-03 PROCEDURE — 86900 BLOOD TYPING SEROLOGIC ABO: CPT

## 2018-08-03 PROCEDURE — 82803 BLOOD GASES ANY COMBINATION: CPT

## 2018-08-03 PROCEDURE — 93005 ELECTROCARDIOGRAM TRACING: CPT

## 2018-08-03 PROCEDURE — 82947 ASSAY GLUCOSE BLOOD QUANT: CPT

## 2018-08-03 PROCEDURE — 87798 DETECT AGENT NOS DNA AMP: CPT

## 2018-08-03 PROCEDURE — P9016: CPT

## 2018-08-03 PROCEDURE — 88305 TISSUE EXAM BY PATHOLOGIST: CPT

## 2018-08-03 PROCEDURE — 86901 BLOOD TYPING SEROLOGIC RH(D): CPT

## 2018-08-03 PROCEDURE — 80048 BASIC METABOLIC PNL TOTAL CA: CPT

## 2018-08-03 PROCEDURE — 86850 RBC ANTIBODY SCREEN: CPT

## 2018-08-03 PROCEDURE — 86923 COMPATIBILITY TEST ELECTRIC: CPT

## 2018-08-03 PROCEDURE — 85027 COMPLETE CBC AUTOMATED: CPT

## 2018-08-03 PROCEDURE — 87633 RESP VIRUS 12-25 TARGETS: CPT

## 2018-08-03 PROCEDURE — 84295 ASSAY OF SERUM SODIUM: CPT

## 2018-08-03 PROCEDURE — 88312 SPECIAL STAINS GROUP 1: CPT

## 2018-08-03 PROCEDURE — 99231 SBSQ HOSP IP/OBS SF/LOW 25: CPT

## 2018-08-03 PROCEDURE — 81001 URINALYSIS AUTO W/SCOPE: CPT

## 2018-08-03 PROCEDURE — 82330 ASSAY OF CALCIUM: CPT

## 2018-08-03 PROCEDURE — 83605 ASSAY OF LACTIC ACID: CPT

## 2018-08-03 PROCEDURE — 85014 HEMATOCRIT: CPT

## 2018-08-03 PROCEDURE — 82272 OCCULT BLD FECES 1-3 TESTS: CPT

## 2018-08-03 PROCEDURE — 87486 CHLMYD PNEUM DNA AMP PROBE: CPT

## 2018-08-03 RX ORDER — CEFUROXIME AXETIL 250 MG
500 TABLET ORAL EVERY 12 HOURS
Qty: 0 | Refills: 0 | Status: DISCONTINUED | OUTPATIENT
Start: 2018-08-03 | End: 2018-08-03

## 2018-08-03 RX ORDER — CEFUROXIME AXETIL 250 MG
250 TABLET ORAL EVERY 12 HOURS
Qty: 0 | Refills: 0 | Status: DISCONTINUED | OUTPATIENT
Start: 2018-08-03 | End: 2018-08-03

## 2018-08-03 RX ORDER — FERROUS SULFATE 325(65) MG
1 TABLET ORAL
Qty: 0 | Refills: 0 | COMMUNITY
Start: 2018-08-03

## 2018-08-03 RX ORDER — DESONIDE OINTMENT, 0.05% 0.5 MG/G
1 OINTMENT TOPICAL
Qty: 0 | Refills: 0 | COMMUNITY

## 2018-08-03 RX ORDER — PANTOPRAZOLE SODIUM 20 MG/1
40 TABLET, DELAYED RELEASE ORAL
Qty: 1200 | Refills: 0 | OUTPATIENT
Start: 2018-08-03 | End: 2018-09-01

## 2018-08-03 RX ADMIN — Medication 325 MILLIGRAM(S): at 16:24

## 2018-08-03 RX ADMIN — PANTOPRAZOLE SODIUM 40 MILLIGRAM(S): 20 TABLET, DELAYED RELEASE ORAL at 16:24

## 2018-08-03 RX ADMIN — Medication 250 MILLIGRAM(S): at 17:04

## 2018-08-03 NOTE — PROGRESS NOTE ADULT - PROBLEM SELECTOR PLAN 6
pam Rivas is the HCP
c/w SCD
son Alexander Rivas is the HCP, will bring the form for record  He would like the patient to be DNR/DNI, he will discuss with family and likely sign the MOLST form

## 2018-08-03 NOTE — PROGRESS NOTE ADULT - PROVIDER SPECIALTY LIST ADULT
Gastroenterology
Internal Medicine
Internal Medicine
Gastroenterology
Gastroenterology
Internal Medicine

## 2018-08-03 NOTE — PROGRESS NOTE ADULT - SUBJECTIVE AND OBJECTIVE BOX
Pre-Endoscopy Evaluation      Referring Physician: Dr. Holly                                 Procedure: Egd    Indication for Procedure: r/o upper GI source of anemia    Pertinent History: 86 year old female with advanced Dementia w/ symptomatic anemia requiring 1 unit PRBCS    Sedation by Anesthesia [X]    PAST MEDICAL & SURGICAL HISTORY:  Dementia  No significant past surgical history      PMH of Gastroparesis [ ]  Gastric Surgery [ ]  Gastric Outlet Obstruction [ ]    Allergies:    No Known Allergies    Intolerances:    Latex allergy: [ ] yes [X] no    Medications:MEDICATIONS  (STANDING):  donepezil 10 milliGRAM(s) Oral at bedtime  pantoprazole   Suspension 40 milliGRAM(s) Oral before dinner  QUEtiapine 25 milliGRAM(s) Oral at bedtime  sodium chloride 0.9%. 1000 milliLiter(s) (75 mL/Hr) IV Continuous <Continuous>    MEDICATIONS  (PRN):      Smoking: [ ] yes  [X] no    AICD/PPM: [ ] yes   [X] no    Pertinent lab data:                        7.3    7.45  )-----------( 265      ( 01 Aug 2018 08:35 )             25.7     07-31    139  |  102  |  26<H>  ----------------------------<  104<H>  3.5   |  23  |  0.93    Ca    9.2      31 Jul 2018 13:58    TPro  8.2  /  Alb  4.4  /  TBili  0.4  /  DBili  x   /  AST  29  /  ALT  15  /  AlkPhos  124<H>  07-31    PT/INR - ( 31 Jul 2018 13:58 )   PT: 12.2 sec;   INR: 1.13 ratio      PTT - ( 31 Jul 2018 13:58 )  PTT:23.3 sec      Physical Examination:  Daily Height in cm: 139.7 (31 Jul 2018 17:36)    Daily   Vital Signs Last 24 Hrs  T(C): 36.8 (01 Aug 2018 11:29), Max: 37 (01 Aug 2018 10:39)  T(F): 98.2 (01 Aug 2018 11:29), Max: 98.6 (01 Aug 2018 10:39)  HR: 92 (01 Aug 2018 11:29) (80 - 102)  BP: 156/72 (01 Aug 2018 11:29) (121/69 - 156/72)  BP(mean): 91 (31 Jul 2018 16:01) (91 - 91)  RR: 18 (01 Aug 2018 11:29) (18 - 18)  SpO2: 97% (01 Aug 2018 11:29) (96% - 99%)    Drug Dosing Weight  Height (cm): 139.7 (31 Jul 2018 17:36)  Weight (kg): 52.5 (31 Jul 2018 17:36)  BMI (kg/m2): 26.9 (31 Jul 2018 17:36)  BSA (m2): 1.39 (31 Jul 2018 17:36)    Constitutional: NAD  Neck:  No JVD  Respiratory: CTAB/L  Cardiovascular: S1 and S2  Gastrointestinal: BS+, soft, NT/ND  Extremities: No peripheral edema  Neurological: Awake, alert  : No Wayne  Skin: No rashes    Comments:    ASA Class: I [ ]  II [ ]  III [X]  IV [ ]    The patient is a suitable candidate for the planned procedure unless box checked [ ]  No, explain:
s/p EGD with treatment of duodenal AVM.  Hb improved 2pts with 1u prbc.  No overt bleeding.  Resting comfortably.  Leukocytosis noted.    PAST MEDICAL & SURGICAL HISTORY:  Dementia  No significant past surgical history      MEDICATIONS  (STANDING):  donepezil 10 milliGRAM(s) Oral at bedtime  pantoprazole   Suspension 40 milliGRAM(s) Oral before dinner  QUEtiapine 25 milliGRAM(s) Oral at bedtime  sodium chloride 0.9%. 1000 milliLiter(s) (75 mL/Hr) IV Continuous <Continuous>    MEDICATIONS  (PRN):      Allergies    No Known Allergies    Intolerances        Review of Systems:    UNABLE TO OBTAIN AS PATIENT HAS DEMENTIA    Vital Signs Last 24 Hrs  T(C): 36.9 (02 Aug 2018 13:01), Max: 37.3 (02 Aug 2018 06:25)  T(F): 98.5 (02 Aug 2018 13:01), Max: 99.1 (02 Aug 2018 06:25)  HR: 104 (02 Aug 2018 13:01) (84 - 109)  BP: 126/64 (02 Aug 2018 13:01) (118/68 - 159/70)  BP(mean): --  RR: 18 (02 Aug 2018 13:01) (17 - 18)  SpO2: 96% (02 Aug 2018 13:01) (94% - 96%)    PHYSICAL EXAM:    Constitutional: NAD, well-developed  Neck: No LAD, supple  Respiratory: CTA and P  Cardiovascular: S1 and S2, RRR, no M  Gastrointestinal: BS+, soft, NT/ND, neg HSM,  Extremities: No peripheral edema, neg clubing, cyanosis  Vascular: 2+ peripheral pulses  Neurological: A/O x 3, no focal deficits  Psychiatric: Normal mood, normal affect  Skin: No rashes        LABS:                        9.6    18.9  )-----------( 260      ( 02 Aug 2018 11:10 )             30.6     07-31    139  |  102  |  26<H>  ----------------------------<  104<H>  3.5   |  23  |  0.93    Ca    9.2      31 Jul 2018 13:58    TPro  8.2  /  Alb  4.4  /  TBili  0.4  /  DBili  x   /  AST  29  /  ALT  15  /  AlkPhos  124<H>  07-31    LIVER FUNCTIONS - ( 31 Jul 2018 13:58 )  Alb: 4.4 g/dL / Pro: 8.2 g/dL / ALK PHOS: 124 U/L / ALT: 15 U/L / AST: 29 U/L / GGT: x           PT/INR - ( 31 Jul 2018 13:58 )   PT: 12.2 sec;   INR: 1.13 ratio         PTT - ( 31 Jul 2018 13:58 )  PTT:23.3 sec                    RADIOLOGY & ADDITIONAL TESTS:
Patient is a 86y old  Female who presents with a chief complaint of GI Hemorrhage (01 Aug 2018 13:35)      SUBJECTIVE / OVERNIGHT EVENTS:    Patient seen and examined. no acute events. denies complaints. poor historian. brett EGD yday.      Vital Signs Last 24 Hrs  T(C): 37.3 (02 Aug 2018 06:25), Max: 37.3 (02 Aug 2018 06:25)  T(F): 99.1 (02 Aug 2018 06:25), Max: 99.1 (02 Aug 2018 06:25)  HR: 88 (02 Aug 2018 06:25) (84 - 109)  BP: 118/68 (02 Aug 2018 06:38) (118/68 - 159/70)  BP(mean): --  RR: 18 (02 Aug 2018 06:25) (17 - 18)  SpO2: 95% (02 Aug 2018 06:25) (94% - 98%)  I&O's Summary    01 Aug 2018 07:01  -  02 Aug 2018 07:00  --------------------------------------------------------  IN: 345 mL / OUT: 0 mL / NET: 345 mL        PE:  GENERAL: NAD, AAOx1  HEAD:  Atraumatic, Normocephalic  EYES: EOMI, PERRLA, conjunctiva and sclera clear  NECK: Supple, No JVD  CHEST/LUNG: CTABL, No wheeze  HEART: Regular rate and rhythm; no murmur  ABDOMEN: Soft, Nontender, Nondistended; Bowel sounds present  EXTREMITIES:  2+ Peripheral Pulses, No clubbing, cyanosis, or edema  SKIN: No rashes or lesions  NEURO: No focal deficits    LABS:                        9.4    19.06 )-----------( 243      ( 02 Aug 2018 07:40 )             31.2     07-31    139  |  102  |  26<H>  ----------------------------<  104<H>  3.5   |  23  |  0.93    Ca    9.2      31 Jul 2018 13:58    TPro  8.2  /  Alb  4.4  /  TBili  0.4  /  DBili  x   /  AST  29  /  ALT  15  /  AlkPhos  124<H>  07-31    PT/INR - ( 31 Jul 2018 13:58 )   PT: 12.2 sec;   INR: 1.13 ratio         PTT - ( 31 Jul 2018 13:58 )  PTT:23.3 sec  CAPILLARY BLOOD GLUCOSE                RADIOLOGY & ADDITIONAL TESTS:    Imaging Personally Reviewed:  [x] YES  [ ] NO    Consultant(s) Notes Reviewed:  [x] YES  [ ] NO    MEDICATIONS  (STANDING):  donepezil 10 milliGRAM(s) Oral at bedtime  pantoprazole   Suspension 40 milliGRAM(s) Oral before dinner  QUEtiapine 25 milliGRAM(s) Oral at bedtime  sodium chloride 0.9%. 1000 milliLiter(s) (75 mL/Hr) IV Continuous <Continuous>    MEDICATIONS  (PRN):      Care Discussed with Consultants/Other Providers [x] YES  [ ] NO    HEALTH ISSUES - PROBLEM Dx:  Prophylactic measure: Prophylactic measure  Advance care planning: Advance care planning  Dependent edema: Dependent edema  GI bleed: GI bleed  Anemia due to blood loss: Anemia due to blood loss  Dementia: Dementia
Patient is a 86y old  Female who presents with a chief complaint of GI Hemorrhage (31 Jul 2018 18:28)      SUBJECTIVE / OVERNIGHT EVENTS:    Patient seen and examined. poor historian 2/2 dementia. aid at bedside. no signs of bleeding per nursing. pt denies complaints, denies pain.      Vital Signs Last 24 Hrs  T(C): 36.7 (01 Aug 2018 05:10), Max: 36.9 (31 Jul 2018 20:43)  T(F): 98.1 (01 Aug 2018 05:10), Max: 98.5 (31 Jul 2018 20:43)  HR: 91 (01 Aug 2018 05:10) (80 - 102)  BP: 126/72 (01 Aug 2018 05:10) (125/65 - 145/72)  BP(mean): 91 (31 Jul 2018 16:01) (91 - 91)  RR: 18 (01 Aug 2018 05:10) (18 - 18)  SpO2: 96% (01 Aug 2018 05:10) (96% - 99%)  I&O's Summary      PE:  GENERAL: NAD, AAOx3  HEAD:  Atraumatic, Normocephalic  EYES: EOMI, PERRLA, conjunctiva and sclera clear  NECK: Supple, No JVD  CHEST/LUNG: CTABL, No wheeze  HEART: Regular rate and rhythm; + murmur  ABDOMEN: Soft, Nontender, Nondistended; Bowel sounds present  EXTREMITIES:  2+ Peripheral Pulses, No clubbing, cyanosis, or edema  SKIN: No rashes or lesions  NEURO: No focal deficits    LABS:                        7.3    7.45  )-----------( 265      ( 01 Aug 2018 08:35 )             25.7     07-31    139  |  102  |  26<H>  ----------------------------<  104<H>  3.5   |  23  |  0.93    Ca    9.2      31 Jul 2018 13:58    TPro  8.2  /  Alb  4.4  /  TBili  0.4  /  DBili  x   /  AST  29  /  ALT  15  /  AlkPhos  124<H>  07-31    PT/INR - ( 31 Jul 2018 13:58 )   PT: 12.2 sec;   INR: 1.13 ratio         PTT - ( 31 Jul 2018 13:58 )  PTT:23.3 sec  CAPILLARY BLOOD GLUCOSE                RADIOLOGY & ADDITIONAL TESTS:    Imaging Personally Reviewed:  [x] YES  [ ] NO    Consultant(s) Notes Reviewed:  [x] YES  [ ] NO    MEDICATIONS  (STANDING):  donepezil 10 milliGRAM(s) Oral at bedtime  pantoprazole   Suspension 40 milliGRAM(s) Oral before dinner  QUEtiapine 25 milliGRAM(s) Oral at bedtime  sodium chloride 0.9%. 1000 milliLiter(s) (75 mL/Hr) IV Continuous <Continuous>    MEDICATIONS  (PRN):      Care Discussed with Consultants/Other Providers [x] YES  [ ] NO    HEALTH ISSUES - PROBLEM Dx:  Prophylactic measure: Prophylactic measure  Advance care planning: Advance care planning  Dependent edema: Dependent edema  GI bleed: GI bleed  Anemia due to blood loss: Anemia due to blood loss  Dementia: Dementia
s/p EGD with treatment of duodenal AVM.  Hb 8.0 this AM but improved to 8.8  No overt bleeding.  Resting comfortably.  Treating for UTI    PAST MEDICAL & SURGICAL HISTORY:  Dementia  No significant past surgical history      MEDICATIONS  (STANDING):  donepezil 10 milliGRAM(s) Oral at bedtime  pantoprazole   Suspension 40 milliGRAM(s) Oral before dinner  QUEtiapine 25 milliGRAM(s) Oral at bedtime  sodium chloride 0.9%. 1000 milliLiter(s) (75 mL/Hr) IV Continuous <Continuous>    MEDICATIONS  (PRN):      Allergies    No Known Allergies    Intolerances        Review of Systems:    UNABLE TO OBTAIN AS PATIENT HAS DEMENTIA    Vital Signs Last 24 Hrs  T(C): 36.9 (02 Aug 2018 13:01), Max: 37.3 (02 Aug 2018 06:25)  T(F): 98.5 (02 Aug 2018 13:01), Max: 99.1 (02 Aug 2018 06:25)  HR: 104 (02 Aug 2018 13:01) (84 - 109)  BP: 126/64 (02 Aug 2018 13:01) (118/68 - 159/70)  BP(mean): --  RR: 18 (02 Aug 2018 13:01) (17 - 18)  SpO2: 96% (02 Aug 2018 13:01) (94% - 96%)    PHYSICAL EXAM:    Constitutional: NAD, well-developed  Neck: No LAD, supple  Respiratory: CTA and P  Cardiovascular: S1 and S2, RRR, no M  Gastrointestinal: BS+, soft, NT/ND, neg HSM,  Extremities: No peripheral edema, neg clubing, cyanosis  Vascular: 2+ peripheral pulses  Neurological: A/O x 3, no focal deficits  Psychiatric: Normal mood, normal affect  Skin: No rashes        LABS:                  Hemoglobin: 8.8 g/dL (08-03-18 @ 13:37)  Hemoglobin: 8.0 g/dL (08-03-18 @ 08:11)  Hemoglobin: 8.6 g/dL (08-02-18 @ 20:31)  Hemoglobin: 9.6 g/dL (08-02-18 @ 11:10)  Hemoglobin: 9.4 g/dL (08-02-18 @ 07:40)                      RADIOLOGY & ADDITIONAL TESTS:
Patient is a 86y old  Female who presents with a chief complaint of GI Hemorrhage (01 Aug 2018 13:35)      SUBJECTIVE / OVERNIGHT EVENTS:    Patient seen and examined. denies cp sob.  UA pos yesterday, started on ceftriaxone.      Vital Signs Last 24 Hrs  T(C): 37 (03 Aug 2018 05:36), Max: 37.2 (02 Aug 2018 20:20)  T(F): 98.6 (03 Aug 2018 05:36), Max: 98.9 (02 Aug 2018 20:20)  HR: 81 (03 Aug 2018 05:36) (81 - 104)  BP: 113/69 (03 Aug 2018 05:36) (105/66 - 126/64)  BP(mean): --  RR: 18 (03 Aug 2018 05:36) (18 - 18)  SpO2: 95% (03 Aug 2018 05:36) (95% - 96%)  I&O's Summary    02 Aug 2018 07:01  -  03 Aug 2018 07:00  --------------------------------------------------------  IN: 1305 mL / OUT: 0 mL / NET: 1305 mL        PE:  GENERAL: NAD, AAOx1  HEAD:  Atraumatic, Normocephalic  EYES: EOMI, PERRLA, conjunctiva and sclera clear  NECK: Supple, No JVD  CHEST/LUNG: CTABL, No wheeze  HEART: Regular rate and rhythm; + murmur  ABDOMEN: Soft, Nontender, Nondistended; Bowel sounds present  EXTREMITIES:  2+ Peripheral Pulses, No clubbing, cyanosis, or edema  SKIN: No rashes or lesions  NEURO: No focal deficits    LABS:                        8.0    8.04  )-----------( 187      ( 03 Aug 2018 08:11 )             27.1     08-03    141  |  109<H>  |  28<H>  ----------------------------<  102<H>  3.9   |  22  |  0.91    Ca    8.3<L>      03 Aug 2018 06:16        CAPILLARY BLOOD GLUCOSE            Urinalysis Basic - ( 02 Aug 2018 20:42 )    Color: Yellow / Appearance: Clear / S.028 / pH: x  Gluc: x / Ketone: Negative  / Bili: Negative / Urobili: Negative   Blood: x / Protein: 30 mg/dL / Nitrite: Positive   Leuk Esterase: Large / RBC: 0-2 /HPF / WBC 25-50 /HPF   Sq Epi: x / Non Sq Epi: Occasional /HPF / Bacteria: Moderate /HPF        RADIOLOGY & ADDITIONAL TESTS:    Imaging Personally Reviewed:  [x] YES  [ ] NO    Consultant(s) Notes Reviewed:  [x] YES  [ ] NO    MEDICATIONS  (STANDING):  cefTRIAXone   IVPB 1 Gram(s) IV Intermittent every 24 hours  cefTRIAXone   IVPB      donepezil 10 milliGRAM(s) Oral at bedtime  ferrous    sulfate 325 milliGRAM(s) Oral daily  pantoprazole   Suspension 40 milliGRAM(s) Oral before dinner  QUEtiapine 25 milliGRAM(s) Oral at bedtime    MEDICATIONS  (PRN):      Care Discussed with Consultants/Other Providers [x] YES  [ ] NO    HEALTH ISSUES - PROBLEM Dx:  Leukocytosis, unspecified type: Leukocytosis, unspecified type  Prophylactic measure: Prophylactic measure  Advance care planning: Advance care planning  Dependent edema: Dependent edema  GI bleed: GI bleed  Anemia due to blood loss: Anemia due to blood loss  Dementia: Dementia

## 2018-08-03 NOTE — PROGRESS NOTE ADULT - PROBLEM SELECTOR PLAN 5
no pedal edema noted, per son Patient is asymptomatic   leg elevation   hold off on lasix 30 mg daily for now.
no pedal edema noted, per son Patient is asymptomatic   leg elevation   hold off on lasix 30 mg daily for now.
son Alexander Rivas is the HCP, will bring the form for record  He would like the patient to be DNR/DNI, he will discuss with family and likely sign the MOLST form

## 2018-08-03 NOTE — CHART NOTE - NSCHARTNOTEFT_GEN_A_CORE
MEDICINE [facility]     Urinalysis Basic - ( 02 Aug 2018 20:42 )    Color: Yellow / Appearance: Clear / S.028 / pH: x  Gluc: x / Ketone: Negative  / Bili: Negative / Urobili: Negative   Blood: x / Protein: 30 mg/dL / Nitrite: Positive   Leuk Esterase: Large / RBC: 0-2 /HPF / WBC 25-50 /HPF   Sq Epi: x / Non Sq Epi: Occasional /HPF / Bacteria: Moderate /HPF    Urine culture sent. Ceftriaxone 1 gm q 24 started pending culture results. Discussed with [facility] on call Dr. Mendoza who is in agreement with plan. Pt remained afebrile overnight. F/u leukocytosis with CBC in AM. Escalated abx per clinical course. Endorsed to primary team in AM. Attending to follow.     Deborah Fink PA-C   Department of Medicine MEDICINE [facility]     Urinalysis Basic - ( 02 Aug 2018 20:42 )    Color: Yellow / Appearance: Clear / S.028 / pH: x  Gluc: x / Ketone: Negative  / Bili: Negative / Urobili: Negative   Blood: x / Protein: 30 mg/dL / Nitrite: Positive   Leuk Esterase: Large / RBC: 0-2 /HPF / WBC 25-50 /HPF   Sq Epi: x / Non Sq Epi: Occasional /HPF / Bacteria: Moderate /HPF    Urine culture sent. Ceftriaxone 1 gm q 24 started pending culture results.  Pt remained afebrile overnight. F/u leukocytosis with CBC in AM. Escalate abx per clinical course. Discussed with [facility] on call Dr. Mendoza who is in agreement with plan. Endorsed to primary team in AM. Attending to follow.     Deborah Fink PA-C   Department of Medicine

## 2018-08-03 NOTE — PROGRESS NOTE ADULT - PROBLEM SELECTOR PLAN 4
leukocytosis improved   likely 2/2 UTI, pos UA, urine culture sent  ceftriaxone day 2
leukocytosis 19 on this AM CBC, afebrile, no localizing source  repeat CBC  if still elevated will hold DC and observe for fevers and symptoms for 24 hrs
no pedal edema noted, per son Patient is asymptomatic   leg elevation   hold off on lasix 30 mg daily for now.

## 2018-08-03 NOTE — PROGRESS NOTE ADULT - ASSESSMENT
87 yo F w/ symptomatic anemia s/p EGD with treatment of duodenal AVM  Hb improving without overt GI bleed  Leukocytosis noted, workup per primary team  Diet as tolerated.        [phone]
87 y.o female with PMhx of Alzheimer's dementia was sent to the hospital by PCP due to significant drop in H/H 6.9. and weakness for the last few months. In the ED was noted to have + guaiac, Hb of 7.9, hemodynamic stable has not required any PRBC, is being admitted for symptomatic anemia 2/2 GI bleed.
87 y.o female with PMhx of Alzheimer's dementia was sent to the hospital by PCP due to significant drop in H/H 6.9. and weakness for the last few months. In the ED was noted to have + guaiac, Hb of 7.9, hemodynamic stable has not required any PRBC, is being admitted for symptomatic anemia 2/2 GI bleed.
87 yo F w/ symptomatic anemia s/p EGD with treatment of duodenal AVM  Hb stable without overt GI bleed  Plan for outpatient followup with Dr. Schmidt, possible outpatient video capsule endoscopy, if persistent anemia on iron.  Diet as tolerated.    [phone]
87 y.o female with PMhx of Alzheimer's dementia was sent to the hospital by PCP due to significant drop in H/H 6.9. and weakness for the last few months. In the ED was noted to have + guaiac, Hb of 7.9, hemodynamic stable has not required any PRBC, is being admitted for symptomatic anemia 2/2 GI bleed.

## 2018-08-03 NOTE — PROGRESS NOTE ADULT - PROBLEM SELECTOR PLAN 2
no signs of active blding but hgb 7.3  will transfuse 1 unit prbc goal >8  serial h/h  hemodynamically stable  protonix  GI consult for possible EGD / colonoscopy
no signs of active blding, sp 1 unit prbc, now 9.4  maintain goal >8  serial h/h  hemodynamically stable  protonix  sp EGD medium-sized hiatus hernia. single submucosal papule (nodule) found in the stomach. Biopsied. Two non-bleeding angioectasias in the duodenum treated APC. Small intestinal angioectasias are likely the etiology of  the patient's chronic microcytic anemia. Would defer further evaluation. There are likely more angioectasias in the mid and distal small intestine  which can continue to be source of chronic blood loss. Recommend iron supplementation and intermittent blood transfusions as needed.  if further anemia, consider capsule endoscopy and possible single balloon enteroscopy to obliterate additional angioectasias if this is consistent w/  patient's goals of care.  start iron
no signs of active blding, sp 1 unit prbc  repeat CBC at noon, if hgb 8 or less, transfuse 1 unit  all lines decreased, possible dilutional from IVF  serial h/h  hemodynamically stable  protonix  sp EGD medium-sized hiatus hernia. single submucosal papule (nodule) found in the stomach. Biopsied. Two non-bleeding angioectasias in the duodenum treated APC. Small intestinal angioectasias are likely the etiology of  the patient's chronic microcytic anemia. Would defer further evaluation. There are likely more angioectasias in the mid and distal small intestine  which can continue to be source of chronic blood loss. Recommend iron supplementation and intermittent blood transfusions as needed.  if further anemia, consider capsule endoscopy and possible single balloon enteroscopy to obliterate additional angioectasias if this is consistent w/ patient's goals of care.  start iron

## 2018-08-03 NOTE — PROGRESS NOTE ADULT - PROBLEM SELECTOR PLAN 1
Donepezil 10 mg daily  Quetiapine 25 mg daily  frequent reorientation

## 2018-08-04 RX ORDER — CEFUROXIME AXETIL 250 MG
1 TABLET ORAL
Qty: 3 | Refills: 0 | OUTPATIENT
Start: 2018-08-04 | End: 2018-08-05

## 2018-08-04 RX ORDER — CEFUROXIME AXETIL 250 MG
1 TABLET ORAL
Qty: 3 | Refills: 0
Start: 2018-08-04 | End: 2018-08-05

## 2020-09-01 NOTE — DISCHARGE NOTE ADULT - NS AS DC FU INST LIST INST
Anesthesia POST Procedure Evaluation    Patient: Emir Angela   MRN:     [id number] Gender:   male   Age:    36 year old :      1984        Preoperative Diagnosis: Abnormal liver function [R94.5]   Procedure(s):  NEEDLE BIOPSY, LIVER, PERCUTANEOUS   Postop Comments: No value filed.     Anesthesia Type: MAC       Disposition: Outpatient   Postop Pain Control: Uneventful            Sign Out: Well controlled pain   PONV: No   Neuro/Psych: Uneventful            Sign Out: Acceptable/Baseline neuro status   Airway/Respiratory: Uneventful            Sign Out: Acceptable/Baseline resp. status   CV/Hemodynamics: Uneventful            Sign Out: Acceptable CV status   Other NRE: NONE   DID A NON-ROUTINE EVENT OCCUR? No         Last Anesthesia Record Vitals:  CRNA VITALS  2020 1013 - 2020 1113      2020             Pulse:  65    Ht Rate:  64    SpO2:  98 %    Resp Rate (set):  10          Last PACU Vitals:  Vitals Value Taken Time   BP     Temp     Pulse     Resp     SpO2     Temp src     NIBP 92/57 2020 10:40 AM   Pulse 65 2020 10:43 AM   SpO2 98 % 2020 10:43 AM   Resp     Temp     Ht Rate 64 2020 10:43 AM   Temp 2           Electronically Signed By: Neo Domínguez MD, 2020, 1:57 PM  
no

## 2023-07-04 ENCOUNTER — INPATIENT (INPATIENT)
Facility: HOSPITAL | Age: 88
LOS: 0 days | Discharge: HOME HEALTH | DRG: 388 | End: 2023-07-05
Attending: SURGERY | Admitting: SURGERY
Payer: MEDICARE

## 2023-07-04 VITALS
TEMPERATURE: 98 F | WEIGHT: 97 LBS | SYSTOLIC BLOOD PRESSURE: 110 MMHG | RESPIRATION RATE: 18 BRPM | HEART RATE: 82 BPM | DIASTOLIC BLOOD PRESSURE: 86 MMHG | OXYGEN SATURATION: 97 %

## 2023-07-04 PROBLEM — F03.90 UNSPECIFIED DEMENTIA WITHOUT BEHAVIORAL DISTURBANCE: Chronic | Status: ACTIVE | Noted: 2018-07-31

## 2023-07-04 LAB
ALBUMIN SERPL ELPH-MCNC: 3.6 G/DL — SIGNIFICANT CHANGE UP (ref 3.3–5)
ALP SERPL-CCNC: 115 U/L — SIGNIFICANT CHANGE UP (ref 40–120)
ALT FLD-CCNC: 9 U/L — LOW (ref 10–45)
ANION GAP SERPL CALC-SCNC: 11 MMOL/L — SIGNIFICANT CHANGE UP (ref 5–17)
APTT BLD: 25.1 SEC — LOW (ref 27.5–35.5)
AST SERPL-CCNC: 21 U/L — SIGNIFICANT CHANGE UP (ref 10–40)
BASOPHILS # BLD AUTO: 0.03 K/UL — SIGNIFICANT CHANGE UP (ref 0–0.2)
BASOPHILS NFR BLD AUTO: 0.5 % — SIGNIFICANT CHANGE UP (ref 0–2)
BILIRUB SERPL-MCNC: 0.4 MG/DL — SIGNIFICANT CHANGE UP (ref 0.2–1.2)
BLD GP AB SCN SERPL QL: NEGATIVE — SIGNIFICANT CHANGE UP
BUN SERPL-MCNC: 14 MG/DL — SIGNIFICANT CHANGE UP (ref 7–23)
CALCIUM SERPL-MCNC: 9.1 MG/DL — SIGNIFICANT CHANGE UP (ref 8.4–10.5)
CHLORIDE SERPL-SCNC: 101 MMOL/L — SIGNIFICANT CHANGE UP (ref 96–108)
CO2 SERPL-SCNC: 24 MMOL/L — SIGNIFICANT CHANGE UP (ref 22–31)
CREAT SERPL-MCNC: 1.19 MG/DL — SIGNIFICANT CHANGE UP (ref 0.5–1.3)
EGFR: 43 ML/MIN/1.73M2 — LOW
EOSINOPHIL # BLD AUTO: 0.11 K/UL — SIGNIFICANT CHANGE UP (ref 0–0.5)
EOSINOPHIL NFR BLD AUTO: 1.7 % — SIGNIFICANT CHANGE UP (ref 0–6)
GAS PNL BLDV: SIGNIFICANT CHANGE UP
GLUCOSE SERPL-MCNC: 101 MG/DL — HIGH (ref 70–99)
HCT VFR BLD CALC: 31 % — LOW (ref 34.5–45)
HGB BLD-MCNC: 9.5 G/DL — LOW (ref 11.5–15.5)
IMM GRANULOCYTES NFR BLD AUTO: 0.5 % — SIGNIFICANT CHANGE UP (ref 0–0.9)
INR BLD: 1.26 RATIO — HIGH (ref 0.88–1.16)
LIDOCAIN IGE QN: 14 U/L — SIGNIFICANT CHANGE UP (ref 7–60)
LYMPHOCYTES # BLD AUTO: 1.33 K/UL — SIGNIFICANT CHANGE UP (ref 1–3.3)
LYMPHOCYTES # BLD AUTO: 20.6 % — SIGNIFICANT CHANGE UP (ref 13–44)
MCHC RBC-ENTMCNC: 26.2 PG — LOW (ref 27–34)
MCHC RBC-ENTMCNC: 30.6 GM/DL — LOW (ref 32–36)
MCV RBC AUTO: 85.6 FL — SIGNIFICANT CHANGE UP (ref 80–100)
MONOCYTES # BLD AUTO: 0.62 K/UL — SIGNIFICANT CHANGE UP (ref 0–0.9)
MONOCYTES NFR BLD AUTO: 9.6 % — SIGNIFICANT CHANGE UP (ref 2–14)
NEUTROPHILS # BLD AUTO: 4.34 K/UL — SIGNIFICANT CHANGE UP (ref 1.8–7.4)
NEUTROPHILS NFR BLD AUTO: 67.1 % — SIGNIFICANT CHANGE UP (ref 43–77)
NRBC # BLD: 0 /100 WBCS — SIGNIFICANT CHANGE UP (ref 0–0)
PLATELET # BLD AUTO: 220 K/UL — SIGNIFICANT CHANGE UP (ref 150–400)
POTASSIUM SERPL-MCNC: 4.1 MMOL/L — SIGNIFICANT CHANGE UP (ref 3.5–5.3)
POTASSIUM SERPL-SCNC: 4.1 MMOL/L — SIGNIFICANT CHANGE UP (ref 3.5–5.3)
PROT SERPL-MCNC: 7.2 G/DL — SIGNIFICANT CHANGE UP (ref 6–8.3)
PROTHROM AB SERPL-ACNC: 14.5 SEC — HIGH (ref 10.5–13.4)
RBC # BLD: 3.62 M/UL — LOW (ref 3.8–5.2)
RBC # FLD: 17 % — HIGH (ref 10.3–14.5)
RH IG SCN BLD-IMP: POSITIVE — SIGNIFICANT CHANGE UP
SODIUM SERPL-SCNC: 136 MMOL/L — SIGNIFICANT CHANGE UP (ref 135–145)
WBC # BLD: 6.46 K/UL — SIGNIFICANT CHANGE UP (ref 3.8–10.5)
WBC # FLD AUTO: 6.46 K/UL — SIGNIFICANT CHANGE UP (ref 3.8–10.5)

## 2023-07-04 PROCEDURE — 74177 CT ABD & PELVIS W/CONTRAST: CPT | Mod: 26,MD

## 2023-07-04 PROCEDURE — 99285 EMERGENCY DEPT VISIT HI MDM: CPT

## 2023-07-04 PROCEDURE — 71045 X-RAY EXAM CHEST 1 VIEW: CPT | Mod: 26

## 2023-07-04 RX ORDER — HALOPERIDOL DECANOATE 100 MG/ML
2 INJECTION INTRAMUSCULAR ONCE
Refills: 0 | Status: COMPLETED | OUTPATIENT
Start: 2023-07-04 | End: 2023-07-04

## 2023-07-04 RX ORDER — SODIUM CHLORIDE 9 MG/ML
500 INJECTION INTRAMUSCULAR; INTRAVENOUS; SUBCUTANEOUS ONCE
Refills: 0 | Status: COMPLETED | OUTPATIENT
Start: 2023-07-04 | End: 2023-07-04

## 2023-07-04 RX ORDER — HALOPERIDOL DECANOATE 100 MG/ML
2 INJECTION INTRAMUSCULAR ONCE
Refills: 0 | Status: DISCONTINUED | OUTPATIENT
Start: 2023-07-04 | End: 2023-07-04

## 2023-07-04 RX ORDER — ACETAMINOPHEN 500 MG
650 TABLET ORAL ONCE
Refills: 0 | Status: COMPLETED | OUTPATIENT
Start: 2023-07-04 | End: 2023-07-04

## 2023-07-04 RX ADMIN — Medication 260 MILLIGRAM(S): at 18:38

## 2023-07-04 RX ADMIN — HALOPERIDOL DECANOATE 2 MILLIGRAM(S): 100 INJECTION INTRAMUSCULAR at 19:34

## 2023-07-04 RX ADMIN — Medication 1 MILLIGRAM(S): at 18:38

## 2023-07-04 RX ADMIN — Medication 0.5 MILLIGRAM(S): at 17:44

## 2023-07-04 NOTE — ED ADULT NURSE NOTE - OBJECTIVE STATEMENT
1555 pt 89yf w/ son at bedside [place] spkg, dx abn ct scan from 7/3/23, pt in no acute distress pending eval

## 2023-07-04 NOTE — ED CLERICAL - NS ED CLERK NOTE PRE-ARRIVAL INFORMATION; ADDITIONAL PRE-ARRIVAL INFORMATION
CC/Reason For referral: one month of weight loss and decrease eating and now with abdominal pain.  Also has aortic stenosis. Outpatient testing revealed intestinal Intussusception.  Preferred Consultant(if applicable):  Who admits for you (if needed): [facility]  Do you have documents you would like to fax over? no  Would you still like to speak to an ED attending? yes please

## 2023-07-04 NOTE — ED PROVIDER NOTE - PHYSICAL EXAMINATION
CONSTITUTIONAL: Patient is awake, alert and oriented x 3. elderly appearing;   HEAD: NCAT  EYES: PERRL bilaterally,   ENT: Airway patent, Nasal mucosa clear.  NECK: Supple,   LUNGS: CTA B/L,  HEART: RRR.+S1S2   ABDOMEN:  (+) right sided abdominal ttp;   MSK:  FROM upper and lower ext b/l,   SKIN: No rash or lesions  NEURO: No focal deficits,

## 2023-07-04 NOTE — ED PROVIDER NOTE - NS ED MD DISPO ADMITTING SERVICE
patient has been using nasal spray however states she is still having post nasal drip requesting something be sent to her pharmacy please advise SURG

## 2023-07-04 NOTE — ED PROVIDER NOTE - PROGRESS NOTE DETAILS
pt w/ intussusception +hiatal hernia phone call to Dr. Koo,  follows w/ [facility]- yesterday had a dry CT scan pt accompanied by son coco who provides history pt remains agitated in stretcher s/p ativan and tylenol. 1:1 at bedside. Will give haldol. Jeniffer Limon PA-C Attending note (Alfredo): Patient was endorsed to me at signout 89-year-old female with intussusception pending surgical consultation.  Surgery has evaluated patient and patient to be admitted to surgical service for further evaluation and management.

## 2023-07-04 NOTE — ED ADULT NURSE NOTE - NSFALLHARMRISKINTERV_ED_ALL_ED

## 2023-07-04 NOTE — ED PROVIDER NOTE - CLINICAL SUMMARY MEDICAL DECISION MAKING FREE TEXT BOX
ap- 89 F w/ hx of HTN, here w/ hiatal hernia. presents to the ER w/ ap- 89 F w/ hx of HTN, here w/ hiatal hernia. presents to the ER w/ abnl ct scan, pt accompanied by son coco, pt w/ dementia, aaox1 she has an aide at home. pt w/ abd pain weight loss. dec po intake, unknown last BM,   on exam, pt is awake and alert has distended abd w/ tenderness, she has no cva tenderness, clear lungs, holosystolic murmur. pt w/ no lower leg edema, able to ambulate, pt w/ 5/5 upper extremity strength b/l. pt to have labs imaging of the abdomen as prior ct revealed intussuception, plan for repeat imaging, possible intussception vs malignancy, vs obstruction.

## 2023-07-04 NOTE — CONSULT NOTE ADULT - ASSESSMENT
89F PMH dementia, PSH hysterectomy p/t ED following outpatient CT scan of intestinal intussusception. CT scan was done as outpatient 2/2 decreased appetite for several weeks, weight loss (7 lbs). Ileocecal intussusception observed in imaging    PLAN  - Pending discussion with son and attending

## 2023-07-04 NOTE — ED PROVIDER NOTE - NS ED ATTENDING STATEMENT MOD
This was a shared visit with the SILAS. I reviewed and verified the documentation and independently performed the documented:

## 2023-07-04 NOTE — ED PROVIDER NOTE - OBJECTIVE STATEMENT
89 y/o female with PMHx of dementia, presents to the ED sent in by doctor Lucie for CT scan outpatient which revealed intestinal Intussusception. As per son patient has been having decreased appetite for few weeks. She is unable to eat or drink. She has had a 7 lb weight loss this week. She went to her doctor for these symptoms. He ordered a CT scan yesterday. They received phone call today that her scan was abnormal and to go to the ED for evaluation. Patient has hx of dementia and is unable to provide any history or ros. She does not have any complaints at this time. All information obtained from son at bedside.

## 2023-07-05 VITALS
HEART RATE: 86 BPM | TEMPERATURE: 97 F | DIASTOLIC BLOOD PRESSURE: 68 MMHG | RESPIRATION RATE: 18 BRPM | OXYGEN SATURATION: 97 % | SYSTOLIC BLOOD PRESSURE: 102 MMHG

## 2023-07-05 DIAGNOSIS — F03.90 UNSPECIFIED DEMENTIA WITHOUT BEHAVIORAL DISTURBANCE: ICD-10-CM

## 2023-07-05 DIAGNOSIS — Z90.710 ACQUIRED ABSENCE OF BOTH CERVIX AND UTERUS: Chronic | ICD-10-CM

## 2023-07-05 LAB
ANION GAP SERPL CALC-SCNC: 10 MMOL/L — SIGNIFICANT CHANGE UP (ref 5–17)
BUN SERPL-MCNC: 11 MG/DL — SIGNIFICANT CHANGE UP (ref 7–23)
CALCIUM SERPL-MCNC: 8.7 MG/DL — SIGNIFICANT CHANGE UP (ref 8.4–10.5)
CHLORIDE SERPL-SCNC: 106 MMOL/L — SIGNIFICANT CHANGE UP (ref 96–108)
CO2 SERPL-SCNC: 24 MMOL/L — SIGNIFICANT CHANGE UP (ref 22–31)
CREAT SERPL-MCNC: 1.05 MG/DL — SIGNIFICANT CHANGE UP (ref 0.5–1.3)
EGFR: 50 ML/MIN/1.73M2 — LOW
GLUCOSE SERPL-MCNC: 109 MG/DL — HIGH (ref 70–99)
HCT VFR BLD CALC: 29.1 % — LOW (ref 34.5–45)
HGB BLD-MCNC: 9 G/DL — LOW (ref 11.5–15.5)
LACTATE SERPL-SCNC: 1.2 MMOL/L — SIGNIFICANT CHANGE UP (ref 0.5–2)
MAGNESIUM SERPL-MCNC: 2 MG/DL — SIGNIFICANT CHANGE UP (ref 1.6–2.6)
MCHC RBC-ENTMCNC: 26.2 PG — LOW (ref 27–34)
MCHC RBC-ENTMCNC: 30.9 GM/DL — LOW (ref 32–36)
MCV RBC AUTO: 84.6 FL — SIGNIFICANT CHANGE UP (ref 80–100)
NRBC # BLD: 0 /100 WBCS — SIGNIFICANT CHANGE UP (ref 0–0)
PHOSPHATE SERPL-MCNC: 3.1 MG/DL — SIGNIFICANT CHANGE UP (ref 2.5–4.5)
PLATELET # BLD AUTO: 197 K/UL — SIGNIFICANT CHANGE UP (ref 150–400)
POTASSIUM SERPL-MCNC: 3.9 MMOL/L — SIGNIFICANT CHANGE UP (ref 3.5–5.3)
POTASSIUM SERPL-SCNC: 3.9 MMOL/L — SIGNIFICANT CHANGE UP (ref 3.5–5.3)
RBC # BLD: 3.44 M/UL — LOW (ref 3.8–5.2)
RBC # FLD: 16.8 % — HIGH (ref 10.3–14.5)
SODIUM SERPL-SCNC: 140 MMOL/L — SIGNIFICANT CHANGE UP (ref 135–145)
WBC # BLD: 5.64 K/UL — SIGNIFICANT CHANGE UP (ref 3.8–10.5)
WBC # FLD AUTO: 5.64 K/UL — SIGNIFICANT CHANGE UP (ref 3.8–10.5)

## 2023-07-05 PROCEDURE — 82435 ASSAY OF BLOOD CHLORIDE: CPT

## 2023-07-05 PROCEDURE — 36415 COLL VENOUS BLD VENIPUNCTURE: CPT

## 2023-07-05 PROCEDURE — 85014 HEMATOCRIT: CPT

## 2023-07-05 PROCEDURE — 85610 PROTHROMBIN TIME: CPT

## 2023-07-05 PROCEDURE — 84132 ASSAY OF SERUM POTASSIUM: CPT

## 2023-07-05 PROCEDURE — 71045 X-RAY EXAM CHEST 1 VIEW: CPT

## 2023-07-05 PROCEDURE — 82330 ASSAY OF CALCIUM: CPT

## 2023-07-05 PROCEDURE — 83735 ASSAY OF MAGNESIUM: CPT

## 2023-07-05 PROCEDURE — 80053 COMPREHEN METABOLIC PANEL: CPT

## 2023-07-05 PROCEDURE — 86900 BLOOD TYPING SEROLOGIC ABO: CPT

## 2023-07-05 PROCEDURE — 85018 HEMOGLOBIN: CPT

## 2023-07-05 PROCEDURE — 99285 EMERGENCY DEPT VISIT HI MDM: CPT | Mod: 25

## 2023-07-05 PROCEDURE — 80048 BASIC METABOLIC PNL TOTAL CA: CPT

## 2023-07-05 PROCEDURE — 83690 ASSAY OF LIPASE: CPT

## 2023-07-05 PROCEDURE — 74018 RADEX ABDOMEN 1 VIEW: CPT

## 2023-07-05 PROCEDURE — 85027 COMPLETE CBC AUTOMATED: CPT

## 2023-07-05 PROCEDURE — 84295 ASSAY OF SERUM SODIUM: CPT

## 2023-07-05 PROCEDURE — 86901 BLOOD TYPING SEROLOGIC RH(D): CPT

## 2023-07-05 PROCEDURE — 85025 COMPLETE CBC W/AUTO DIFF WBC: CPT

## 2023-07-05 PROCEDURE — 82803 BLOOD GASES ANY COMBINATION: CPT

## 2023-07-05 PROCEDURE — 74018 RADEX ABDOMEN 1 VIEW: CPT | Mod: 26,76

## 2023-07-05 PROCEDURE — 86850 RBC ANTIBODY SCREEN: CPT

## 2023-07-05 PROCEDURE — 96374 THER/PROPH/DIAG INJ IV PUSH: CPT

## 2023-07-05 PROCEDURE — 83605 ASSAY OF LACTIC ACID: CPT

## 2023-07-05 PROCEDURE — 74177 CT ABD & PELVIS W/CONTRAST: CPT | Mod: MD

## 2023-07-05 PROCEDURE — 85730 THROMBOPLASTIN TIME PARTIAL: CPT

## 2023-07-05 PROCEDURE — 84100 ASSAY OF PHOSPHORUS: CPT

## 2023-07-05 PROCEDURE — 82947 ASSAY GLUCOSE BLOOD QUANT: CPT

## 2023-07-05 RX ORDER — DEXTROSE MONOHYDRATE, SODIUM CHLORIDE, AND POTASSIUM CHLORIDE 50; .745; 4.5 G/1000ML; G/1000ML; G/1000ML
1000 INJECTION, SOLUTION INTRAVENOUS
Refills: 0 | Status: DISCONTINUED | OUTPATIENT
Start: 2023-07-05 | End: 2023-07-05

## 2023-07-05 RX ORDER — PANTOPRAZOLE SODIUM 20 MG/1
40 TABLET, DELAYED RELEASE ORAL DAILY
Refills: 0 | Status: DISCONTINUED | OUTPATIENT
Start: 2023-07-05 | End: 2023-07-05

## 2023-07-05 RX ADMIN — DEXTROSE MONOHYDRATE, SODIUM CHLORIDE, AND POTASSIUM CHLORIDE 75 MILLILITER(S): 50; .745; 4.5 INJECTION, SOLUTION INTRAVENOUS at 05:11

## 2023-07-05 RX ADMIN — DEXTROSE MONOHYDRATE, SODIUM CHLORIDE, AND POTASSIUM CHLORIDE 75 MILLILITER(S): 50; .745; 4.5 INJECTION, SOLUTION INTRAVENOUS at 01:00

## 2023-07-05 NOTE — DIETITIAN INITIAL EVALUATION ADULT - PERTINENT MEDS FT
MEDICATIONS  (STANDING):  dextrose 5% + sodium chloride 0.45% with potassium chloride 20 mEq/L 1000 milliLiter(s) (75 mL/Hr) IV Continuous <Continuous>    MEDICATIONS  (PRN):

## 2023-07-05 NOTE — H&P ADULT - ASSESSMENT
89F PMH dementia, PSH hysterectomy p/t ED following outpatient CT scan of intestinal intussusception. CT scan was done as outpatient 2/2 decreased appetite for several weeks, weight loss (7 lbs). Ileocecal intussusception observed in imaging. Patient stable, not in extremis    PLAN  - Admit to Dr Davies  - ABRAHAM discussion with son (unable to reach)  - NPO/IVF  - f/u ABD xray for contrast progression  - serial abdominal exams    ACS/Trauma  6370 89F PMH dementia, PSH hysterectomy p/t ED following outpatient CT scan of intestinal intussusception. CT scan was done as outpatient 2/2 decreased appetite for several weeks, weight loss (7 lbs). Ileocecal intussusception observed in imaging. Patient stable, not in extremis    PLAN  - Admit to Dr Davies  - Surgical intervention pending C discussion with son (unable to reach)  - NPO/IVF  - f/u ABD xray for contrast progression  - serial abdominal exams  - pain control after abdominal exam    ACS/Trauma  4994 89F PMH dementia, PSH hysterectomy p/t ED following outpatient CT scan of intestinal intussusception. CT scan was done as outpatient 2/2 decreased appetite for several weeks, weight loss (7 lbs). Ileocecal intussusception observed in imaging. Patient stable, not in extremis    PLAN  - Admit to Dr Davies  - Surgical intervention pending [facility] discussion with son (unable to reach)  - Pending home med rec   - NPO/IVF  - f/u ABD xray for contrast progression  - serial abdominal exams  - pain control after abdominal exam    ACS/Trauma  5953 89F PMH dementia, PSH hysterectomy p/t ED following outpatient CT scan of intestinal intussusception. CT scan was done as outpatient 2/2 decreased appetite for several weeks, weight loss (7 lbs). Ileocecal intussusception observed in imaging. Patient stable, not in extremis    PLAN  - Admit to Dr Davies  - Surgical intervention pending [facility] discussion with son (unable to reach; called 6x overnight)  - Pending home med rec   - NPO/IVF  - f/u ABD xray for contrast progression  - serial abdominal exams  - pain control after abdominal exam    ACS/Trauma  9071 89F PMH dementia, PSH hysterectomy p/t ED following outpatient CT scan of intestinal intussusception. CT scan was done as outpatient 2/2 decreased appetite for several weeks, weight loss (7 lbs). Ileocecal intussusception observed in imaging. Patient stable, not in extremis    PLAN  - Admit to Dr Davies  - No surgical intervention at this time  - Patient's son does not believe his mother would want surgery  - Recommend consulting palliative care in AM - patient's son aware and requests consult  - Pending home med rec in AM  - NPO/IVF  - f/u ABD xray for contrast progression  - serial abdominal exams  - pain control after abdominal exam    ACS/Trauma  1846 89F PMH dementia, PSH hysterectomy p/t ED following outpatient CT scan of intestinal intussusception. CT scan was done as outpatient 2/2 decreased appetite for several weeks, weight loss (7 lbs). Ileocecal intussusception observed in imaging. Patient stable, not in extremis    PLAN  - Admit to Dr Davies  - No surgical intervention at this time  - Patient's son does not believe his mother would want surgery  - Recommend consulting palliative care in AM - patient's son aware and requests consult  - Discuss MOLST / DNI/DNR preferences in AM  - Pending home med rec in AM  - NPO/IVF  - f/u ABD xray for contrast progression  - serial abdominal exams  - pain control after abdominal exam    ACS/Trauma  [id number]

## 2023-07-05 NOTE — DISCHARGE NOTE PROVIDER - HOSPITAL COURSE
89F PMH dementia, PSH hysterectomy p/t ED following outpatient CT scan of intestinal intussusception. CT scan was done as outpatient 2/2 decreased appetite for several weeks, weight loss (7 lbs). Pt worked up in ER; Labs wnl, CXR normal, CT a/p done showing "Contrast is seen to the level of the distal ileum, with intussusception of the distal small bowel into the cecum/ascending colon, to the level of the hepatic flexure." Pt was admitted to acute care surgery team. Pt's son who is HCP does not believe his mother would want surgery. Surgical intervention declined at this time. Palliative care consult was recommended but patient's son wishes for patient to be discharged. Pt sone states patient has 24 hr aides at home and state they will be able to set up palliative care outpatient. CM following and notes home care is established. Pt okay for discharge at this time. At the time of discharge, the patient was hemodynamically stable and was comfortable with adequate pain control. The patient and family was instructed to follow up with Dr. Luis after discharge from the hospital if they decide they would like surgical intervention in the future. The patient/family felt comfortable with discharge. The patient was discharged to home with home care aide. The patient had no other issues.

## 2023-07-05 NOTE — H&P ADULT - TIME BILLING
I saw and evaluated patient and agree with above note  patient in no distress  CT findings noted - suspect partial bowel obstruction  Poor baseline mental status noted  worked to reach out to family to discuss possible risks benefits of surgery  family leaning toward non  operative approach which is reasonable given reported baseline functional status

## 2023-07-05 NOTE — H&P ADULT - NSHPLABSRESULTS_GEN_ALL_CORE
LABS:                      9.5  6.46 )-----------( 220    ( 04 Jul 2023 16:23 )             31.0  136  |  101  |  14  ----------------------------<  101<H>    (07-04)  4.1   |  24  |  1.19          Ca    9.1      07-04  Mg    x  Phos  x        LIVER FUNCTIONS - ( 04 Jul 2023 16:23 )  Alb: 3.6 g/dL / Pro: 7.2 g/dL / ALK PHOS: 115 U/L / ALT: 9 U/L / AST: 21 U/L / GGT: x      PT/INR - ( 04 Jul 2023 16:23 )   PT: 14.5 sec;   INR: 1.26 ratio         PTT - ( 04 Jul 2023 16:23 )  PTT:25.1 sec  Urinalysis Basic - ( 04 Jul 2023 16:23 )    Color: x / Appearance: x / SG: x / pH: x  Gluc: 101 mg/dL / Ketone: x  / Bili: x / Urobili: x   Blood: x / Protein: x / Nitrite: x   Leuk Esterase: x / RBC: x / WBC x   Sq Epi: x / Non Sq Epi: x / Bacteria: x      ____________________________________________    RADIOLOGY & ADDITIONAL STUDIES:    < from: CT Abdomen and Pelvis w/ Oral Cont and w/ IV Cont (07.04.23 @ 20:34) >  FINDINGS:  Motion artifact.    LOWER CHEST: Right lower lobe cysts. Bibasilar atelectasis. Cardiomegaly.   Trace pericardial effusion. Aortic valve calcifications. Trace right   pleural fluid.    LIVER: Within normal limits.  BILE DUCTS: Normal caliber.  GALLBLADDER: Cholelithiasis.  SPLEEN: Within normal limits.  PANCREAS: Within normal limits.  ADRENALS: Within normal limits.  KIDNEYS/URETERS: Subcentimeter hypodensities bilaterally which are too   small to characterize. No hydronephrosis.    BLADDER: Distended.  REPRODUCTIVE ORGANS: Hysterectomy.    BOWEL: Small hiatal hernia. Contrast is seen to the level of the distal   ileum, with intussusception of the distal small bowel into the   cecum/ascending colon, to the level of the hepaticflexure. Motion limits   evaluation of surrounding inflammatory changes and edema. Scattered   colonic diverticula. Appendix is not visualized.  PERITONEUM: Trace perihepatic fluid (image 5-71, 3-31).  VESSELS: Atherosclerotic changes.  RETROPERITONEUM/LYMPH NODES: No lymphadenopathy.  ABDOMINAL WALL: Within normal limits.  BONES: Degenerative changes. Scoliosis.    IMPRESSION:  Motion artifact.    Intussusception of the distal small bowel into the cecum/ascending colon   to the level of the hepatic flexure.        --- End of Report ---    < end of copied text >  < from: Xray Chest 1 View- PORTABLE-Urgent (Xray Chest 1 View- PORTABLE-Urgent .) (07.04.23 @ 16:38) >    FINDINGS:  The cardiomediastinal silhouette is  not accurately assessed in this AP   projection.  Left basilar subsegmental atelectasis. No focal consolidations.  There is no pneumothorax or pleural effusion.  No acute bony abnormality.    IMPRESSION:  No focal consolidations    < end of copied text >

## 2023-07-05 NOTE — DIETITIAN INITIAL EVALUATION ADULT - OTHER INFO
Weight: Son endorses weight loss over the last 2 months. States weight during last MD appointment was ~ 105lbs, current dosing weight is ~97lbs. Height missing in chart, per Son patient height is ~4'8ft.

## 2023-07-05 NOTE — DISCHARGE NOTE PROVIDER - NSFOLLOWUPCLINICS_GEN_ALL_ED_FT
[facility]  Geriatrics  [address]  [place] [address]  Phone: [phone]  Fax:   Follow Up Time: 1 week

## 2023-07-05 NOTE — DISCHARGE NOTE NURSING/CASE MANAGEMENT/SOCIAL WORK - PATIENT PORTAL LINK FT
You can access the FollowMyHealth Patient Portal offered by [facility] by registering at the following website: [email or web address]. By joining [facility]’s FollowMyHealth portal, you will also be able to view your health information using other applications (apps) compatible with our system.

## 2023-07-05 NOTE — H&P ADULT - NSHPPHYSICALEXAM_GEN_ALL_CORE
PHYSICAL EXAM:  General: AAOx0, no acute distress.  Respiratory: breathing comfortably, no increased WOB   Abdomen: soft, +TTP RUQ/RLQ, nondistended, no rebound, no guarding  Extremities: Moves all four, all pulses palpable. PHYSICAL EXAM:  General: AAOx0, no acute distress.  Respiratory: breathing comfortably, no increased WOB   Abdomen: soft, +TTP RUQ/RLQ (deep palpation), no discomfort with soft palpation, nondistended, no rebound, no guarding  Extremities: Moves all four, all pulses palpable.

## 2023-07-05 NOTE — H&P ADULT - UNABLE TO OBTAIN: SOCIAL HISTORY SUBSTANCE USE

## 2023-07-05 NOTE — DIETITIAN INITIAL EVALUATION ADULT - ENERGY INTAKE
Poor (<50%) In-house pt noted with improvement in PO intake, Son feeding patient breakfast, consumed 2 pancakes. No acute GI distress noted. Discussed with Son importance of PO intake and prioritizing sources of protein to maintain lean body mass as feasible. Son reports pt has consumed Ensure shakes in the past but stopped due to stomach upset, believes pt may have a sensitivity to lactose. Agreeable to trial of Xuanyixia shakes (non-dairy) to see if better tolerated. Patient with no nutrition-related questions at this time. Made aware RD remains available as needed.

## 2023-07-05 NOTE — DISCHARGE NOTE PROVIDER - NSDCCPCAREPLAN_GEN_ALL_CORE_FT
PRINCIPAL DISCHARGE DIAGNOSIS  Diagnosis: Intussusception  Assessment and Plan of Treatment: Please call the Palliative care number on discharge papers as soon as possible to get connected with Palliative Care Provider.   ACTIVITY: No heavy lifting anything more than 10-15lbs or straining. Otherwise, you may return to your usual level of physical activity. Use Rolling walker with assistance.   DIET: Resume your normal diet   NOTIFY YOUR SURGEON IF: You have any bleeding that does not stop, any pus draining from your wound, any fever (over 100.4 F) or chills, persistent nausea/vomiting with inability to tolerate food or liquids, persistent diarrhea, or if your pain is not controlled on your discharge pain medications.  FOLLOW-UP:  1. Please call Dr. Luis office if you decide at any point you would like surgical intervention. Call to make a follow-up appointment within one week of discharge   2. Please follow up with your primary care physician in one week regarding your hospitalization.

## 2023-07-05 NOTE — DISCHARGE NOTE PROVIDER - CARE PROVIDER_API CALL
Jami Luis  Surgical Critical Care  [address] [place] [address], [address]  [place] [address]  Phone: [phone]  Fax: [phone]  Follow Up Time: 1 week

## 2023-07-05 NOTE — H&P ADULT - HISTORY OF PRESENT ILLNESS
89F PMH dementia, PSH hysterectomy p/t ED following outpatient CT scan of intestinal intussusception. CT scan was done as outpatient 2/2 decreased appetite for several weeks, weight loss (7 lbs).     In the ED, patient was afebrile, VSS , WBC 6.5, lactate 1.0, CT shows     Surgery consulted for management of ileocecal intussusception to level of hepatic flexure 89F PMH dementia, PSH hysterectomy p/t ED following outpatient CT scan of intestinal intussusception. CT scan was done as outpatient 2/2 decreased appetite for several weeks, weight loss (7 lbs).     In the ED, patient was afebrile, VSS , WBC 6.5, lactate 1.0, CT shows Intussusception of the distal small bowel into the cecum/ascending colon to the level of the hepatic flexure.    Surgery consulted for management of ileocecal intussusception to level of hepatic flexure

## 2023-07-05 NOTE — DIETITIAN INITIAL EVALUATION ADULT - PERTINENT LABORATORY DATA
07-05    140  |  106  |  11  ----------------------------<  109<H>  3.9   |  24  |  1.05    Ca    8.7      05 Jul 2023 07:29  Phos  3.1     07-05  Mg     2.0     07-05    TPro  7.2  /  Alb  3.6  /  TBili  0.4  /  DBili  x   /  AST  21  /  ALT  9<L>  /  AlkPhos  115  07-04

## 2023-07-05 NOTE — DIETITIAN INITIAL EVALUATION ADULT - ORAL INTAKE PTA/DIET HISTORY
Pt noted with dementia, son at bedside. Son reports pt with poor PO intake and appetite x 1 month. States pt has been  consuming very small portions. Tends to prefer sweeter items such as cookies, cakes over protein rich foods such as chicken. NKFA. No reports of chewing/swallowing difficulty, nausea, vomiting, diarrhea, constipation.

## 2023-07-05 NOTE — ED ADULT NURSE REASSESSMENT NOTE - NS ED NURSE REASSESS COMMENT FT1
pt soiled her self with urine, pt was cleaned and changed. Adjusted to comfort. No further RN interventions are needed at this moment.
1730 pt became aggressive and tried to pull out her ivl w/ family at bedside, ivl wrapped pending ct scan

## 2023-07-05 NOTE — DISCHARGE NOTE PROVIDER - DETAILS OF MALNUTRITION DIAGNOSIS/DIAGNOSES
This patient has been assessed with a concern for Malnutrition and was treated during this hospitalization for the following Nutrition diagnosis/diagnoses:     -  07/05/2023: Severe protein-calorie malnutrition

## 2023-07-05 NOTE — DIETITIAN INITIAL EVALUATION ADULT - ADD RECOMMEND
1) Continue current diet as tolerated. 2) Recommend addition of [employer] Standard Shake 2x daily to supplement PO intake. 3) Encourage PO intake of protein-rich foods. 4) RD to remain available and follow-up as medically appropriate. 5) Malnutrition alert placed in EMR.

## 2023-07-05 NOTE — DISCHARGE NOTE PROVIDER - NSDCMRMEDTOKEN_GEN_ALL_CORE_FT
donepezil 10 mg oral tablet: 1 tab(s) orally once a day  ferrous sulfate 325 mg (65 mg elemental iron) oral tablet: 1 tab(s) orally once a day  furosemide 20 mg oral tablet: 1 tab(s) orally once a day  pantoprazole 40 mg oral granule, delayed release: 40 milligram(s) orally once a day   QUEtiapine 25 mg oral tablet: 1 tab(s) orally once a day (at bedtime)  Rolling Walker: Length of need - 99    R60.9

## 2023-07-05 NOTE — DISCHARGE NOTE NURSING/CASE MANAGEMENT/SOCIAL WORK - NSDCPEFALRISK_GEN_ALL_CORE
For information on Fall & Injury Prevention, visit: [email or web address] OR  [email or web address] OR  https://www.cdc.gov/steadi/patient.html

## 2023-07-05 NOTE — PATIENT PROFILE ADULT - FALL HARM RISK - HARM RISK INTERVENTIONS
Assistance OOB with selected safe patient handling equipment/Communicate Risk of Fall with Harm to all staff/Discuss with provider need for PT consult/Monitor for mental status changes/Monitor gait and stability/Move patient closer to nurses' station/Provide patient with walking aids - walker, cane, crutches/Reinforce activity limits and safety measures with patient and family/Reorient to person, place and time as needed/Tailored Fall Risk Interventions/Toileting schedule using arm’s reach rule for commode and bathroom/Use of alarms - bed, chair and/or voice tab/Visual Cue: Yellow wristband and red socks/Bed in lowest position, wheels locked, appropriate side rails in place/Call bell, personal items and telephone in reach/Instruct patient to call for assistance before getting out of bed or chair/Non-slip footwear when patient is out of bed/[place] to call system/Physically safe environment - no spills, clutter or unnecessary equipment/Purposeful Proactive Rounding/Room/bathroom lighting operational, light cord in reach

## 2024-10-14 NOTE — ED PROVIDER NOTE - HIV OFFER
No answer on call back. Left vm.     ----- Message from Cira sent at 10/14/2024 11:02 AM CDT -----  Type:  Needs Medical Advice/Symptom-based Call    Who Called: pt requesting to speak to nurse. She was given antibiotics for her nipples and didn't take them correctly and is now having the same problem. Call pt    Symptoms (please be specific): nipple     How long has patient had these symptoms:  week    Would the patient rather a call back or a response via My [facility]? call    Best Call Back Number: [phone] (home)   Additional Information:  
Opt out

## 2025-06-23 NOTE — DISCHARGE NOTE NURSING/CASE MANAGEMENT/SOCIAL WORK - CAREGIVER ADDRESS
Patient mobility status ambulates with no difficulty.     I have reviewed discharge instructions with the patient and spouse.  The patient and spouse verbalized understanding.    Patient left ED via Discharge Method: ambulatory to Home with Spouse.    Opportunity for questions and clarification provided.     Patient given 1 scripts.           Florencia King, RN  06/23/25 4190     [address] [place] [id number]